# Patient Record
Sex: MALE | Race: WHITE | Employment: UNEMPLOYED | URBAN - METROPOLITAN AREA
[De-identification: names, ages, dates, MRNs, and addresses within clinical notes are randomized per-mention and may not be internally consistent; named-entity substitution may affect disease eponyms.]

---

## 2021-08-15 ENCOUNTER — APPOINTMENT (OUTPATIENT)
Dept: CT IMAGING | Age: 56
End: 2021-08-15

## 2021-08-15 ENCOUNTER — APPOINTMENT (OUTPATIENT)
Dept: GENERAL RADIOLOGY | Age: 56
End: 2021-08-15

## 2021-08-15 ENCOUNTER — HOSPITAL ENCOUNTER (EMERGENCY)
Age: 56
Discharge: LEFT BEFORE DISCHARGE | End: 2021-08-15
Attending: EMERGENCY MEDICINE

## 2021-08-15 VITALS
SYSTOLIC BLOOD PRESSURE: 114 MMHG | HEART RATE: 85 BPM | TEMPERATURE: 98.4 F | WEIGHT: 180 LBS | DIASTOLIC BLOOD PRESSURE: 82 MMHG | RESPIRATION RATE: 15 BRPM | BODY MASS INDEX: 25.2 KG/M2 | HEIGHT: 71 IN | OXYGEN SATURATION: 93 %

## 2021-08-15 DIAGNOSIS — R07.9 CHEST PAIN, UNSPECIFIED TYPE: Primary | ICD-10-CM

## 2021-08-15 LAB
A/G RATIO: 1.6 (ref 1.1–2.2)
ALBUMIN SERPL-MCNC: 4.3 G/DL (ref 3.4–5)
ALP BLD-CCNC: 87 U/L (ref 40–129)
ALT SERPL-CCNC: 37 U/L (ref 10–40)
ANION GAP SERPL CALCULATED.3IONS-SCNC: 18 MMOL/L (ref 3–16)
AST SERPL-CCNC: 27 U/L (ref 15–37)
BASOPHILS ABSOLUTE: 0.1 K/UL (ref 0–0.2)
BASOPHILS RELATIVE PERCENT: 1.1 %
BILIRUB SERPL-MCNC: <0.2 MG/DL (ref 0–1)
BUN BLDV-MCNC: 9 MG/DL (ref 7–20)
CALCIUM SERPL-MCNC: 9.4 MG/DL (ref 8.3–10.6)
CHLORIDE BLD-SCNC: 107 MMOL/L (ref 99–110)
CO2: 22 MMOL/L (ref 21–32)
CREAT SERPL-MCNC: <0.5 MG/DL (ref 0.9–1.3)
EOSINOPHILS ABSOLUTE: 0.3 K/UL (ref 0–0.6)
EOSINOPHILS RELATIVE PERCENT: 3.7 %
GFR AFRICAN AMERICAN: >60
GFR NON-AFRICAN AMERICAN: >60
GLOBULIN: 2.7 G/DL
GLUCOSE BLD-MCNC: 113 MG/DL (ref 70–99)
HCT VFR BLD CALC: 45.5 % (ref 40.5–52.5)
HEMOGLOBIN: 15.8 G/DL (ref 13.5–17.5)
LYMPHOCYTES ABSOLUTE: 4.5 K/UL (ref 1–5.1)
LYMPHOCYTES RELATIVE PERCENT: 51.9 %
MCH RBC QN AUTO: 35.8 PG (ref 26–34)
MCHC RBC AUTO-ENTMCNC: 34.7 G/DL (ref 31–36)
MCV RBC AUTO: 103.2 FL (ref 80–100)
MONOCYTES ABSOLUTE: 0.5 K/UL (ref 0–1.3)
MONOCYTES RELATIVE PERCENT: 6.3 %
NEUTROPHILS ABSOLUTE: 3.2 K/UL (ref 1.7–7.7)
NEUTROPHILS RELATIVE PERCENT: 37 %
PDW BLD-RTO: 15.1 % (ref 12.4–15.4)
PLATELET # BLD: 296 K/UL (ref 135–450)
PMV BLD AUTO: 7.4 FL (ref 5–10.5)
POTASSIUM REFLEX MAGNESIUM: 3.9 MMOL/L (ref 3.5–5.1)
RBC # BLD: 4.41 M/UL (ref 4.2–5.9)
SARS-COV-2, NAAT: NOT DETECTED
SODIUM BLD-SCNC: 147 MMOL/L (ref 136–145)
TOTAL PROTEIN: 7 G/DL (ref 6.4–8.2)
TROPONIN: <0.01 NG/ML
WBC # BLD: 8.6 K/UL (ref 4–11)

## 2021-08-15 PROCEDURE — 6370000000 HC RX 637 (ALT 250 FOR IP): Performed by: EMERGENCY MEDICINE

## 2021-08-15 PROCEDURE — 84484 ASSAY OF TROPONIN QUANT: CPT

## 2021-08-15 PROCEDURE — 93005 ELECTROCARDIOGRAM TRACING: CPT | Performed by: EMERGENCY MEDICINE

## 2021-08-15 PROCEDURE — 99284 EMERGENCY DEPT VISIT MOD MDM: CPT

## 2021-08-15 PROCEDURE — 80053 COMPREHEN METABOLIC PANEL: CPT

## 2021-08-15 PROCEDURE — 6360000004 HC RX CONTRAST MEDICATION: Performed by: EMERGENCY MEDICINE

## 2021-08-15 PROCEDURE — 71045 X-RAY EXAM CHEST 1 VIEW: CPT

## 2021-08-15 PROCEDURE — 87635 SARS-COV-2 COVID-19 AMP PRB: CPT

## 2021-08-15 PROCEDURE — 71260 CT THORAX DX C+: CPT

## 2021-08-15 PROCEDURE — 85025 COMPLETE CBC W/AUTO DIFF WBC: CPT

## 2021-08-15 RX ORDER — ASPIRIN 81 MG/1
324 TABLET, CHEWABLE ORAL ONCE
Status: COMPLETED | OUTPATIENT
Start: 2021-08-15 | End: 2021-08-15

## 2021-08-15 RX ADMIN — IOPAMIDOL 75 ML: 755 INJECTION, SOLUTION INTRAVENOUS at 02:54

## 2021-08-15 RX ADMIN — ASPIRIN 324 MG: 81 TABLET, CHEWABLE ORAL at 02:54

## 2021-08-15 ASSESSMENT — ENCOUNTER SYMPTOMS
BLOOD IN STOOL: 0
PHOTOPHOBIA: 0
NAUSEA: 0
SHORTNESS OF BREATH: 1
TROUBLE SWALLOWING: 0
VOMITING: 0
BACK PAIN: 0
VOICE CHANGE: 0
COLOR CHANGE: 0
ABDOMINAL PAIN: 0
STRIDOR: 0
WHEEZING: 0
FACIAL SWELLING: 0

## 2021-08-15 ASSESSMENT — PAIN DESCRIPTION - FREQUENCY: FREQUENCY: CONTINUOUS

## 2021-08-15 ASSESSMENT — PAIN DESCRIPTION - LOCATION: LOCATION: CHEST

## 2021-08-15 ASSESSMENT — PAIN DESCRIPTION - ONSET: ONSET: SUDDEN

## 2021-08-15 ASSESSMENT — PAIN DESCRIPTION - ORIENTATION: ORIENTATION: LEFT

## 2021-08-15 ASSESSMENT — PAIN SCALES - GENERAL: PAINLEVEL_OUTOF10: 8

## 2021-08-15 ASSESSMENT — PAIN DESCRIPTION - PROGRESSION: CLINICAL_PROGRESSION: GRADUALLY WORSENING

## 2021-08-15 ASSESSMENT — PAIN DESCRIPTION - PAIN TYPE: TYPE: ACUTE PAIN

## 2021-08-15 ASSESSMENT — PAIN DESCRIPTION - DESCRIPTORS: DESCRIPTORS: CONSTANT;SHARP

## 2021-08-15 NOTE — ED PROVIDER NOTES
[address] [facility]  ED  eMERGENCY dEPARTMENT eNCOUnter      Pt Name: Ervin Galvin  MRN: [id number]  Kehindegfraf 1965  Date of evaluation: 8/15/2021  Provider: Abdoul Araiza MD    [address] [place] [address]       Chief Complaint   Patient presents with    Chest Pain     Patient c/o of upper left chest pain for one hour PTA. HISTORY OF PRESENT ILLNESS   (Location/Symptom, Timing/Onset, Context/Setting, Quality, Duration, Modifying Factors, Severity)  Note limiting factors. The history is provided by the patient. Chest Pain  Pain location:  L chest  Pain quality: sharp    Pain radiates to:  L arm  Pain severity:  Moderate  Onset quality:  Gradual  Duration:  2 days  Timing:  Intermittent  Progression:  Waxing and waning  Chronicity:  New  Relieved by:  Nothing  Worsened by:  Nothing  Ineffective treatments:  None tried  Associated symptoms: shortness of breath    Associated symptoms: no abdominal pain, no back pain, no dysphagia, no fever, no nausea, no palpitations and no vomiting    Risk factors: hypertension and smoking    Risk factors: no coronary artery disease, no high cholesterol and not obese        Nursing Notes were reviewed. REVIEW OFSYSTEMS    (2-9 systems for level 4, 10 or more for level 5)     Review of Systems   Constitutional: Negative for appetite change, fever and unexpected weight change. HENT: Negative for facial swelling, trouble swallowing and voice change. Eyes: Negative for photophobia and visual disturbance. Respiratory: Positive for shortness of breath. Negative for wheezing and stridor. Cardiovascular: Positive for chest pain. Negative for palpitations. Gastrointestinal: Negative for abdominal pain, blood in stool, nausea and vomiting. Genitourinary: Negative for difficulty urinating and dysuria. Musculoskeletal: Negative for back pain, gait problem and neck pain. Skin: Negative for color change and wound.    Neurological: Negative for seizures, syncope and speech difficulty. Psychiatric/Behavioral: Negative for self-injury and suicidal ideas. Except as noted above the remainder of the review of systems was reviewed and negative. PAST MEDICAL HISTORY     Past Medical History:   Diagnosis Date    DDD (degenerative disc disease)          SURGICAL HISTORY       Past Surgical History:   Procedure Laterality Date    ABDOMEN SURGERY      FACIAL COSMETIC SURGERY      FOOT SURGERY           CURRENT MEDICATIONS       Discharge Medication List as of 8/15/2021  4:27 AM          ALLERGIES     Patient has no known allergies. FAMILY HISTORY     History reviewed. No pertinent family history. SOCIAL HISTORY       Social History     Socioeconomic History    Marital status:      Spouse name: None    Number of children: None    Years of education: None    Highest education level: None   Occupational History    None   Tobacco Use    Smoking status: Current Every Day Smoker     Packs/day: 1.00     Types: Cigarettes    Smokeless tobacco: Never Used   Vaping Use    Vaping Use: Never used   Substance and Sexual Activity    Alcohol use: Yes     Comment: 12 pk a week    Drug use: Yes     Types: Marijuana     Comment: rarely    Sexual activity: Yes     Partners: Female   Other Topics Concern    None   Social History Narrative    None     Social Determinants of Health     Financial Resource Strain:     Difficulty of Paying Living Expenses:    Food Insecurity:     Worried About Running Out of Food in the Last Year:     Ran Out of Food in the Last Year:    Transportation Needs:     Lack of Transportation (Medical):      Lack of Transportation (Non-Medical):    Physical Activity:     Days of Exercise per Week:     Minutes of Exercise per Session:    Stress:     Feeling of Stress :    Social Connections:     Frequency of Communication with Friends and Family:     Frequency of Social Gatherings with Friends and Family:     Attends Synagogue Services:     Active Member of Clubs or Organizations:     Attends Club or Organization Meetings:     Marital Status:    Intimate Partner Violence:     Fear of Current or Ex-Partner:     Emotionally Abused:     Physically Abused:     Sexually Abused:          PHYSICAL EXAM    (up to 7 for level 4, 8 or more for level 5)     ED Triage Vitals [08/15/21 0040]   BP Temp Temp Source Pulse Resp SpO2 Height Weight   (!) 141/96 98.7 °F (37.1 °C) Oral 108 18 95 % 5' 11\" (1.803 m) 180 lb (81.6 kg)       Physical Exam  Vitals and nursing note reviewed. Constitutional:       General: He is not in acute distress. Appearance: He is well-developed. HENT:      Head: Normocephalic and atraumatic. Right Ear: External ear normal.      Left Ear: External ear normal.   Eyes:      Conjunctiva/sclera: Conjunctivae normal.   Neck:      Vascular: No JVD. Trachea: No tracheal deviation. Cardiovascular:      Rate and Rhythm: Regular rhythm. Tachycardia present. Pulmonary:      Effort: Pulmonary effort is normal. No respiratory distress. Breath sounds: Normal breath sounds. No wheezing. Abdominal:      General: There is no distension. Palpations: Abdomen is soft. Tenderness: There is no abdominal tenderness. There is no guarding or rebound. Musculoskeletal:         General: No tenderness. Normal range of motion. Cervical back: Neck supple. Comments: No lower extremity swelling, tenderness, or palpable cord. Negative John test bilaterally. Skin:     General: Skin is warm and dry. Neurological:      General: No focal deficit present. Mental Status: He is alert and oriented to person, place, and time. Cranial Nerves: No cranial nerve deficit.          DIAGNOSTIC RESULTS     EKG:All EKG's are interpreted by the Emergency Department Physician who either signs or Co-signs this chart in the absence of a cardiologist.    The Ekg interpreted by me shows  sinus Oral   SpO2: 95% 96% 93%   Weight: 180 lb (81.6 kg)     Height: 5' 11\" (1.803 m)           MDM   HEART SCORE:    History: +1 for moderate suspicion  EKG: +1 for nonspecific changes   Age: +1 for age 44-72 years  Risk factors (includes HLD, HTN, DM, tobacco use, obesity, and +FHx): +1 for 1-2 risk factors  Initial troponin: +0 for negative troponin    Heart score: 4. This falls under the following category: Score of 4-6, which indicates low/moderate risk for major adverse cardiac event and supports observation with repeated troponins and/or non-invasive testing      Initial trend is unremarkable. CT chest pulmonary Mosman does not show any evidence of pneumothorax, pulmonary movement, pneumonia, or other emergent pathology. Rapid Covid test is negative. Based on patient's heart score of 4 I have recommended admission for ACS rule out and further medical evaluation. Patient politely refuses this. The patient as decided to leave against medical advice. The patient is awake and alert, non-intoxicated, non-suicidal, nonpsychotic. There is no medical condition that prevents or inhibits their understanding of the risks/benefits of their decision. The patient understands the risks and benefits of their decision and has been given the opportunity to ask questions about their medical condition. Procedures    FINAL IMPRESSION      1. Chest pain, unspecified type          DISPOSITION/PLAN   DISPOSITION [place] 08/15/2021 04:24:57 AM         (Please note that portions of this note were completed with a voice recognition program.  Efforts were made to edit the dictations but occasionally words aremis-transcribed. )    Jessica Desai MD (electronically signed)  Attending Emergency Physician           Jessica Desai MD  08/15/21 5665

## 2021-08-15 NOTE — ED NOTES
I had a long discussion with .his at the bedside regarding the risks and  benefits of further treatment and he wishes to go home. Patient is made aware  of risks including the progression to severe disease or the possibility of death. his  decisional capacity is intact and clearly understands what I am recommending and why. The patient declines the further work up at this time. I have made it clear that he  may return to the ED at any time for further evaluation and treatment. he reports  that he understands that he may return at any time. Patient has chosen to sign out AMA. I have had a long discussion with the patient  regarding the risks associated with leaving prior to completion of their evaluation. These risks include but are not limited to death and severe disability. They understand  the risks associated with leaving and are competent to make this decision. They have been encouraged to return at any time for further evaluation.      Davina Gomez RN  08/15/21 1959

## 2021-08-16 LAB
EKG ATRIAL RATE: 102 BPM
EKG DIAGNOSIS: NORMAL
EKG P AXIS: 62 DEGREES
EKG P-R INTERVAL: 144 MS
EKG Q-T INTERVAL: 336 MS
EKG QRS DURATION: 80 MS
EKG QTC CALCULATION (BAZETT): 437 MS
EKG R AXIS: 43 DEGREES
EKG T AXIS: -11 DEGREES
EKG VENTRICULAR RATE: 102 BPM

## 2021-08-16 PROCEDURE — 93010 ELECTROCARDIOGRAM REPORT: CPT | Performed by: INTERNAL MEDICINE

## 2021-08-18 ENCOUNTER — HOSPITAL ENCOUNTER (OUTPATIENT)
Age: 56
Setting detail: OBSERVATION
Discharge: HOME | End: 2021-08-19
Attending: EMERGENCY MEDICINE | Admitting: INTERNAL MEDICINE

## 2021-08-18 DIAGNOSIS — R07.9 CHEST PAIN, UNSPECIFIED TYPE: Primary | ICD-10-CM

## 2021-08-18 LAB
BASOPHILS ABSOLUTE: 0.1 K/UL (ref 0–0.2)
BASOPHILS RELATIVE PERCENT: 1 %
EOSINOPHILS ABSOLUTE: 0.3 K/UL (ref 0–0.6)
EOSINOPHILS RELATIVE PERCENT: 3.9 %
HCT VFR BLD CALC: 45.2 % (ref 40.5–52.5)
HEMOGLOBIN: 15.6 G/DL (ref 13.5–17.5)
LYMPHOCYTES ABSOLUTE: 2.7 K/UL (ref 1–5.1)
LYMPHOCYTES RELATIVE PERCENT: 41.5 %
MCH RBC QN AUTO: 35.4 PG (ref 26–34)
MCHC RBC AUTO-ENTMCNC: 34.6 G/DL (ref 31–36)
MCV RBC AUTO: 102.5 FL (ref 80–100)
MONOCYTES ABSOLUTE: 0.4 K/UL (ref 0–1.3)
MONOCYTES RELATIVE PERCENT: 6.3 %
NEUTROPHILS ABSOLUTE: 3.1 K/UL (ref 1.7–7.7)
NEUTROPHILS RELATIVE PERCENT: 47.3 %
PDW BLD-RTO: 14.9 % (ref 12.4–15.4)
PLATELET # BLD: 278 K/UL (ref 135–450)
PMV BLD AUTO: 7.2 FL (ref 5–10.5)
RBC # BLD: 4.41 M/UL (ref 4.2–5.9)
WBC # BLD: 6.6 K/UL (ref 4–11)

## 2021-08-18 PROCEDURE — 85025 COMPLETE CBC W/AUTO DIFF WBC: CPT

## 2021-08-18 PROCEDURE — 80053 COMPREHEN METABOLIC PANEL: CPT

## 2021-08-18 PROCEDURE — 83880 ASSAY OF NATRIURETIC PEPTIDE: CPT

## 2021-08-18 PROCEDURE — 93005 ELECTROCARDIOGRAM TRACING: CPT | Performed by: PHYSICIAN ASSISTANT

## 2021-08-18 PROCEDURE — 99285 EMERGENCY DEPT VISIT HI MDM: CPT

## 2021-08-18 PROCEDURE — 84484 ASSAY OF TROPONIN QUANT: CPT

## 2021-08-18 ASSESSMENT — PAIN DESCRIPTION - DESCRIPTORS: DESCRIPTORS: SHARP

## 2021-08-18 ASSESSMENT — PAIN DESCRIPTION - FREQUENCY: FREQUENCY: CONTINUOUS

## 2021-08-18 ASSESSMENT — PAIN DESCRIPTION - PAIN TYPE: TYPE: ACUTE PAIN

## 2021-08-18 ASSESSMENT — PAIN SCALES - GENERAL: PAINLEVEL_OUTOF10: 8

## 2021-08-18 ASSESSMENT — PAIN DESCRIPTION - ORIENTATION: ORIENTATION: LEFT

## 2021-08-18 ASSESSMENT — PAIN DESCRIPTION - LOCATION: LOCATION: CHEST;SHOULDER;ARM

## 2021-08-18 ASSESSMENT — PAIN DESCRIPTION - ONSET: ONSET: ON-GOING

## 2021-08-19 ENCOUNTER — APPOINTMENT (OUTPATIENT)
Dept: GENERAL RADIOLOGY | Age: 56
End: 2021-08-19

## 2021-08-19 VITALS
TEMPERATURE: 98.7 F | WEIGHT: 180 LBS | HEART RATE: 92 BPM | BODY MASS INDEX: 25.2 KG/M2 | DIASTOLIC BLOOD PRESSURE: 83 MMHG | OXYGEN SATURATION: 94 % | HEIGHT: 71 IN | RESPIRATION RATE: 16 BRPM | SYSTOLIC BLOOD PRESSURE: 137 MMHG

## 2021-08-19 PROBLEM — R07.9 CHEST PAIN: Status: ACTIVE | Noted: 2021-08-19

## 2021-08-19 PROBLEM — R07.89 NON-CARDIAC CHEST PAIN: Status: ACTIVE | Noted: 2021-08-19

## 2021-08-19 LAB
A/G RATIO: 1.6 (ref 1.1–2.2)
ALBUMIN SERPL-MCNC: 4.4 G/DL (ref 3.4–5)
ALP BLD-CCNC: 79 U/L (ref 40–129)
ALT SERPL-CCNC: 33 U/L (ref 10–40)
ANION GAP SERPL CALCULATED.3IONS-SCNC: 15 MMOL/L (ref 3–16)
ANION GAP SERPL CALCULATED.3IONS-SCNC: 15 MMOL/L (ref 3–16)
AST SERPL-CCNC: 32 U/L (ref 15–37)
BILIRUB SERPL-MCNC: <0.2 MG/DL (ref 0–1)
BILIRUBIN URINE: NEGATIVE
BLOOD, URINE: NEGATIVE
BUN BLDV-MCNC: 7 MG/DL (ref 7–20)
BUN BLDV-MCNC: 9 MG/DL (ref 7–20)
CALCIUM SERPL-MCNC: 8.7 MG/DL (ref 8.3–10.6)
CALCIUM SERPL-MCNC: 9.4 MG/DL (ref 8.3–10.6)
CHLORIDE BLD-SCNC: 100 MMOL/L (ref 99–110)
CHLORIDE BLD-SCNC: 102 MMOL/L (ref 99–110)
CHOLESTEROL, TOTAL: 198 MG/DL (ref 0–199)
CLARITY: CLEAR
CO2: 21 MMOL/L (ref 21–32)
CO2: 23 MMOL/L (ref 21–32)
COLOR: YELLOW
CREAT SERPL-MCNC: <0.5 MG/DL (ref 0.9–1.3)
CREAT SERPL-MCNC: <0.5 MG/DL (ref 0.9–1.3)
EKG ATRIAL RATE: 86 BPM
EKG DIAGNOSIS: NORMAL
EKG P AXIS: 23 DEGREES
EKG P-R INTERVAL: 134 MS
EKG Q-T INTERVAL: 356 MS
EKG QRS DURATION: 86 MS
EKG QTC CALCULATION (BAZETT): 426 MS
EKG R AXIS: 41 DEGREES
EKG T AXIS: -9 DEGREES
EKG VENTRICULAR RATE: 86 BPM
GFR AFRICAN AMERICAN: >60
GFR AFRICAN AMERICAN: >60
GFR NON-AFRICAN AMERICAN: >60
GFR NON-AFRICAN AMERICAN: >60
GLOBULIN: 2.7 G/DL
GLUCOSE BLD-MCNC: 106 MG/DL (ref 70–99)
GLUCOSE BLD-MCNC: 85 MG/DL (ref 70–99)
GLUCOSE URINE: NEGATIVE MG/DL
HCT VFR BLD CALC: 43.5 % (ref 40.5–52.5)
HDLC SERPL-MCNC: 102 MG/DL (ref 40–60)
HEMOGLOBIN: 14.9 G/DL (ref 13.5–17.5)
KETONES, URINE: ABNORMAL MG/DL
LDL CHOLESTEROL CALCULATED: 84 MG/DL
LEUKOCYTE ESTERASE, URINE: NEGATIVE
LV EF: 50 %
LVEF MODALITY: NORMAL
MAGNESIUM: 2.1 MG/DL (ref 1.8–2.4)
MCH RBC QN AUTO: 35.4 PG (ref 26–34)
MCHC RBC AUTO-ENTMCNC: 34.3 G/DL (ref 31–36)
MCV RBC AUTO: 103.2 FL (ref 80–100)
MICROSCOPIC EXAMINATION: ABNORMAL
NITRITE, URINE: NEGATIVE
PDW BLD-RTO: 15.5 % (ref 12.4–15.4)
PH UA: 6 (ref 5–8)
PLATELET # BLD: 249 K/UL (ref 135–450)
PMV BLD AUTO: 7.5 FL (ref 5–10.5)
POTASSIUM REFLEX MAGNESIUM: 3.5 MMOL/L (ref 3.5–5.1)
POTASSIUM REFLEX MAGNESIUM: 3.7 MMOL/L (ref 3.5–5.1)
PRO-BNP: 71 PG/ML (ref 0–124)
PROTEIN UA: NEGATIVE MG/DL
RBC # BLD: 4.22 M/UL (ref 4.2–5.9)
SODIUM BLD-SCNC: 138 MMOL/L (ref 136–145)
SODIUM BLD-SCNC: 138 MMOL/L (ref 136–145)
SPECIFIC GRAVITY UA: 1.01 (ref 1–1.03)
TOTAL PROTEIN: 7.1 G/DL (ref 6.4–8.2)
TRIGL SERPL-MCNC: 61 MG/DL (ref 0–150)
TROPONIN: <0.01 NG/ML
URINE TYPE: ABNORMAL
UROBILINOGEN, URINE: 0.2 E.U./DL
VLDLC SERPL CALC-MCNC: 12 MG/DL
WBC # BLD: 5.1 K/UL (ref 4–11)

## 2021-08-19 PROCEDURE — 6370000000 HC RX 637 (ALT 250 FOR IP): Performed by: NURSE PRACTITIONER

## 2021-08-19 PROCEDURE — G0378 HOSPITAL OBSERVATION PER HR: HCPCS

## 2021-08-19 PROCEDURE — 71045 X-RAY EXAM CHEST 1 VIEW: CPT

## 2021-08-19 PROCEDURE — 85027 COMPLETE CBC AUTOMATED: CPT

## 2021-08-19 PROCEDURE — 84484 ASSAY OF TROPONIN QUANT: CPT

## 2021-08-19 PROCEDURE — 6370000000 HC RX 637 (ALT 250 FOR IP): Performed by: PHYSICIAN ASSISTANT

## 2021-08-19 PROCEDURE — 83735 ASSAY OF MAGNESIUM: CPT

## 2021-08-19 PROCEDURE — 93010 ELECTROCARDIOGRAM REPORT: CPT | Performed by: INTERNAL MEDICINE

## 2021-08-19 PROCEDURE — 93351 STRESS TTE COMPLETE: CPT

## 2021-08-19 PROCEDURE — 80061 LIPID PANEL: CPT

## 2021-08-19 PROCEDURE — 36415 COLL VENOUS BLD VENIPUNCTURE: CPT

## 2021-08-19 PROCEDURE — 80048 BASIC METABOLIC PNL TOTAL CA: CPT

## 2021-08-19 PROCEDURE — 81003 URINALYSIS AUTO W/O SCOPE: CPT

## 2021-08-19 RX ORDER — ATORVASTATIN CALCIUM 40 MG/1
40 TABLET, FILM COATED ORAL NIGHTLY
Status: DISCONTINUED | OUTPATIENT
Start: 2021-08-19 | End: 2021-08-19 | Stop reason: HOSPADM

## 2021-08-19 RX ORDER — ACETAMINOPHEN 325 MG/1
650 TABLET ORAL ONCE
Status: COMPLETED | OUTPATIENT
Start: 2021-08-19 | End: 2021-08-19

## 2021-08-19 RX ORDER — ONDANSETRON 2 MG/ML
4 INJECTION INTRAMUSCULAR; INTRAVENOUS EVERY 6 HOURS PRN
Status: DISCONTINUED | OUTPATIENT
Start: 2021-08-19 | End: 2021-08-19 | Stop reason: HOSPADM

## 2021-08-19 RX ORDER — SODIUM CHLORIDE 0.9 % (FLUSH) 0.9 %
10 SYRINGE (ML) INJECTION EVERY 12 HOURS SCHEDULED
Status: DISCONTINUED | OUTPATIENT
Start: 2021-08-19 | End: 2021-08-19 | Stop reason: HOSPADM

## 2021-08-19 RX ORDER — ASPIRIN 81 MG/1
81 TABLET, CHEWABLE ORAL DAILY
Status: DISCONTINUED | OUTPATIENT
Start: 2021-08-20 | End: 2021-08-19 | Stop reason: HOSPADM

## 2021-08-19 RX ORDER — ACETAMINOPHEN 650 MG/1
650 SUPPOSITORY RECTAL EVERY 6 HOURS PRN
Status: DISCONTINUED | OUTPATIENT
Start: 2021-08-19 | End: 2021-08-19 | Stop reason: HOSPADM

## 2021-08-19 RX ORDER — SODIUM CHLORIDE 0.9 % (FLUSH) 0.9 %
10 SYRINGE (ML) INJECTION PRN
Status: DISCONTINUED | OUTPATIENT
Start: 2021-08-19 | End: 2021-08-19 | Stop reason: HOSPADM

## 2021-08-19 RX ORDER — SODIUM CHLORIDE 9 MG/ML
25 INJECTION, SOLUTION INTRAVENOUS PRN
Status: DISCONTINUED | OUTPATIENT
Start: 2021-08-19 | End: 2021-08-19 | Stop reason: HOSPADM

## 2021-08-19 RX ORDER — NICOTINE 21 MG/24HR
1 PATCH, TRANSDERMAL 24 HOURS TRANSDERMAL DAILY
Status: DISCONTINUED | OUTPATIENT
Start: 2021-08-19 | End: 2021-08-19 | Stop reason: HOSPADM

## 2021-08-19 RX ORDER — ONDANSETRON 4 MG/1
4 TABLET, ORALLY DISINTEGRATING ORAL EVERY 8 HOURS PRN
Status: DISCONTINUED | OUTPATIENT
Start: 2021-08-19 | End: 2021-08-19 | Stop reason: HOSPADM

## 2021-08-19 RX ORDER — ACETAMINOPHEN 325 MG/1
650 TABLET ORAL EVERY 6 HOURS PRN
Status: DISCONTINUED | OUTPATIENT
Start: 2021-08-19 | End: 2021-08-19 | Stop reason: HOSPADM

## 2021-08-19 RX ADMIN — ACETAMINOPHEN 650 MG: 325 TABLET ORAL at 01:19

## 2021-08-19 RX ADMIN — NITROGLYCERIN 1 INCH: 20 OINTMENT TOPICAL at 05:17

## 2021-08-19 ASSESSMENT — PAIN SCALES - GENERAL
PAINLEVEL_OUTOF10: 0
PAINLEVEL_OUTOF10: 8
PAINLEVEL_OUTOF10: 0
PAINLEVEL_OUTOF10: 7

## 2021-08-19 ASSESSMENT — PAIN DESCRIPTION - LOCATION: LOCATION: CHEST

## 2021-08-19 ASSESSMENT — PAIN DESCRIPTION - PAIN TYPE: TYPE: ACUTE PAIN

## 2021-08-19 NOTE — DISCHARGE INSTR - COC
Continuity of Care Form    Patient Name: Rodney Pedroza   :  1965  MRN:  [id number]    Admit date:  2021  Discharge date:  ***    Code Status Order: Full Code   Advance Directives:     Admitting Physician:  Viola Kraft MD  PCP: No primary care provider on file. Discharging Nurse: [facility] Unit/Room#: 1690/7139-41  Discharging Unit Phone Number: ***    Emergency Contact:   Extended Emergency Contact Information  Primary Emergency Contact: Mike Arvizu  Address: [address] [place] [address], [address] [facility] Phone: [phone]  Work Phone: [phone]  Mobile Phone: [phone]  Relation: Spouse  Secondary Emergency Contact: None,Per Pt  Relation: Other    Past Surgical History:  Past Surgical History:   Procedure Laterality Date    ABDOMEN SURGERY      FACIAL COSMETIC SURGERY      FOOT SURGERY         Immunization History: There is no immunization history on file for this patient.     Active Problems:  Patient Active Problem List   Diagnosis Code    Chest pain R07.9       Isolation/Infection:   Isolation          No Isolation        Patient Infection Status     Infection Onset Added Last Indicated Last Indicated By Review Planned Expiration Resolved Resolved By    None active    Resolved    COVID-19 Rule Out 08/15/21 08/15/21 08/15/21 COVID-19, Rapid (Ordered)   08/15/21 Rule-Out Test Resulted          Nurse Assessment:  Last Vital Signs: /83   Pulse 92   Temp 98.7 °F (37.1 °C) (Oral)   Resp 16   Ht 5' 11\" (1.803 m)   Wt 180 lb (81.6 kg)   SpO2 94%   BMI 25.10 kg/m²     Last documented pain score (0-10 scale): Pain Level: 0  Last Weight:   Wt Readings from Last 1 Encounters:   21 180 lb (81.6 kg)     Mental Status:  {IP PT MENTAL STATUS:46583}    IV Access:  { JEREMIAH IV ACCESS:242649794}    Nursing Mobility/ADLs:  Walking   {CHP DME AQUC:370139326}  Transfer  {CHP DME MEPX:779925992}  Bathing  {CHP DME HYXU:917256166}  Dressing  {CHP DME DRVZ:320442073}  Toileting  {CHP DME KYND:887841544}  Feeding  {CHP DME BFZB:801269253}  Med Admin  {CHP DME KAHK:536276272}  Med Delivery   { JEREMIAH MED Delivery:351245165}    Wound Care Documentation and Therapy:        Elimination:  Continence:   · Bowel: {YES / NS:49840}  · Bladder: {YES / DR:27652}  Urinary Catheter: {Urinary Catheter:982270427}   Colostomy/Ileostomy/Ileal Conduit: {YES / ZV:20360}       Date of Last BM: ***    Intake/Output Summary (Last 24 hours) at 2021 1657  Last data filed at 2021 1050  Gross per 24 hour   Intake 0 ml   Output --   Net 0 ml     No intake/output data recorded.     Safety Concerns:     [address] Safety Concerns:848430598}    Impairments/Disabilities:      [address] Impairments/Disabilities:089311934}    Nutrition Therapy:  Current Nutrition Therapy:   [address] Diet List:008024863}    Routes of Feeding: {[facility] DME Other Feedings:065092050}  Liquids: {Slp liquid thickness:43960}  Daily Fluid Restriction: {CHP DME Yes amt example:918204953}  Last Modified Barium Swallow with Video (Video Swallowing Test): {Done Not Done FZGO:088859891}    Treatments at the Time of Hospital Discharge:   Respiratory Treatments: ***  Oxygen Therapy:  {Therapy; copd oxygen:17653}  Ventilator:    { CC Vent XPCK:344285737}    Rehab Therapies: {THERAPEUTIC INTERVENTION:4981730769}  Weight Bearing Status/Restrictions: [employer] Weight Bearin}  Other Medical Equipment (for information only, NOT a DME order):  {EQUIPMENT:172014235}  Other Treatments: ***    Patient's personal belongings (please select all that are sent with patient):  {P DME Belongings:825225156}    RN SIGNATURE:  {Esignature:847299238}    CASE MANAGEMENT/SOCIAL WORK SECTION    Inpatient Status Date: ***    Readmission Risk Assessment Score:  Readmission Risk              Risk of Unplanned Readmission:  0           Discharging to Facility/ Agency   · Name: · Address:  · Phone:  · Fax:    Dialysis Facility (if applicable)   · Name:  · Address:  · Dialysis Schedule:  · Phone:  · Fax:    / signature: {Esignature:518576261}    PHYSICIAN SECTION    Prognosis: {Prognosis:1962643385}    Condition at Discharge: Espinoza Mcguire Patient Condition:636411826}    Rehab Potential (if transferring to Rehab): {Prognosis:4467499309}    Recommended Labs or Other Treatments After Discharge: ***    Physician Certification: I certify the above information and transfer of Adolfo Latin  is necessary for the continuing treatment of the diagnosis listed and that he requires {Admit to Appropriate Level of Care:59415} for {GREATER/LESS:699425123} 30 days.      Update Admission H&P: {CHP DME Changes in NRGQB:710102369}    PHYSICIAN SIGNATURE:  {Esignature:527956597}

## 2021-08-19 NOTE — ED NOTES
Bed: 10  Expected date: 8/18/21  Expected time: 10:38 PM  Means of arrival: Ambulance  Comments:  [facility] [address] [facility], RN  08/18/21 8309

## 2021-08-19 NOTE — PROGRESS NOTES
Patient is back from stress test and has had echo. His blood pressure is still high at 173/113, no PRN medication order except for Nitro paste for chest pain. Paged Dr. Annamarie Escobar to inquire about PRN medication.

## 2021-08-19 NOTE — DISCHARGE SUMMARY
Hospital Medicine Discharge Summary    Patient ID: [id number]      Patient's PCP: No primary care provider on file. Admit Date: 8/18/2021     Discharge Date: 8/19/2021 08/19/2021 at 1800    Admitting Physician: Jayant Mitchell MD     Discharge Physician: Maia Nieves MD     Discharge Diagnoses: Active Hospital Problems    Diagnosis     Non-cardiac chest pain [R07.89]        The patient was seen and examined on day of discharge and this discharge summary is in conjunction with any daily progress note from day of discharge. Hospital Course:     49 yo male tobacco user with no known past medical history who presents with atypical chest pain (left shoulder, non radiating and not associated with exertion) and shortness of breath admitted to the hospital. No history of htn or CAD. Unknown family history. Heart score 2. EKG without REYNA elevations. Troponin x 3 normal. Exercise EKG/Echo stress test deemed low risk. Discharged home with strict return precautions. Provided PCP information to establish care. Physical Exam Performed:     /83   Pulse 92   Temp 98.7 °F (37.1 °C) (Oral)   Resp 16   Ht 5' 11\" (1.803 m)   Wt 180 lb (81.6 kg)   SpO2 94%   BMI 25.10 kg/m²       General appearance:  No apparent distress, appears stated age and cooperative. HEENT:  Normal cephalic, atraumatic without obvious deformity. Pupils equal, round, and reactive to light. Extra ocular muscles intact. Conjunctivae/corneas clear. Neck: Supple, with full range of motion. No jugular venous distention. Trachea midline. Respiratory:  Normal respiratory effort. Clear to auscultation, bilaterally without Rales/Wheezes/Rhonchi. Cardiovascular:  Regular rate and rhythm with normal S1/S2 without murmurs, rubs or gallops. Abdomen: Soft, non-tender, non-distended with normal bowel sounds. Musculoskeletal:  No clubbing, cyanosis or edema bilaterally. Full range of motion without deformity.   Skin: Skin

## 2021-08-19 NOTE — CARE COORDINATION
CM met at bedside with patient. Triggered by no PCP or insurance listed. With patient permission, consult to financial counselor and information for [facility] added to AVS. Pt denies further needs. States spouse can transport him home when ready.

## 2021-08-19 NOTE — H&P
Hospital Medicine History & Physical      PCP: No primary care provider on file. Date of Admission: 8/18/2021    Date of Service: Pt seen/examined on 8/19/2021 and Admitted to observation with expected LOS less than two midnights due to medical therapy. Chief Complaint:    Chief Complaint   Patient presents with    Chest Pain     Pt was in the ED on Saturday and encouraged to be admitted for chest pain r/o. Pt did not want to stay. Pt states he began having symptoms today about 0800. Pt took 273 of aspirin and nitro X1 prior to arrival.     Arm Pain     History Of Present Illness:      48 y.o. male, with PMH of tobacco abuse, who presented to [facility] with chest pain. History was obtained from the patient and review of the EMR. The patient states that he presented to the ED on 8/15/2021 due to having significant chest pain and shortness of breath. He states that the pain is in his upper left chest and radiated down his left arm. He does not have any information regarding his family history as he is adopted. He does admit to smoking about a pack of cigarettes every day. On that day, the patient also had CT chest which was unremarkable. He ultimately refused admission for ACS rule out. Yesterday evening, the patient presented back into the ED due to having recurring chest pain in the same area and radiating down his left arm. He became very concerned and decided to come back in for ACS rule out with stress testing. He will be admitted for further evaluation. The patient states that he took aspirin at home and also was given nitro per EMS. He states that the nitro did seem to relieve some of his pain.     Past Medical History:          Diagnosis Date    DDD (degenerative disc disease)        Past Surgical History:          Procedure Laterality Date    ABDOMEN SURGERY      FACIAL COSMETIC SURGERY      FOOT SURGERY         Medications Prior to Admission:      Prior to Admission 23   BUN 7   CREATININE <0.5*   CALCIUM 9.4     Recent Labs     08/18/21  2330   AST 32   ALT 33   BILITOT <0.2   ALKPHOS 79     No results for input(s): INR in the last 72 hours. Recent Labs     08/18/21  2330   TROPONINI <0.01       Urinalysis:      Lab Results   Component Value Date    NITRU Negative 06/01/2018    BLOODU Negative 06/01/2018    SPECGRAV 1.025 06/01/2018    GLUCOSEU Negative 06/01/2018       Radiology:     CXR: I have reviewed the CXR with the following interpretation: No acute cardiopulmonary findings  EKG:  I have reviewed the EKG with the following interpretation: NSR, nonspecific ST abnormality    XR CHEST PORTABLE   Final Result   No acute disease. ASSESSMENT:    Active Hospital Problems    Diagnosis Date Noted    Chest pain [R07.9] 08/19/2021         PLAN:    Chest pain in setting of no known HX of CAD. - Serial troponin - initial negative  - EKG w/o ischemic changes  - FLP in am  - NPO after MN  - Stress echo in am  - PRN nitro  - Tele monitoring  - Heart score 4  - Ordered asa, statin    Tobacco abuse disorder. Current pack a day smoker.  - Tobacco cessation education  - Nicotine patch      DVT Prophylaxis: Lovenox  Diet: No diet orders on file  Code Status: No Order    PT/OT Eval Status: Not indicated    Dispo -pending cardiac work-up       Beatriz Doherty - NP    Thank you No primary care provider on file. for the opportunity to be involved in this patient's care.  If you have any questions or concerns please feel free to contact me at [phone].  -------------------------Dr. Erika Cunningham-------------------------

## 2021-08-19 NOTE — ED PROVIDER NOTES
I independently examined and evaluated [address] [place] [address]. All diagnostic, treatment, and disposition decisions were made by myself in conjunction with the BEN, Fransisca Gonzalez. For all further details of the patient's emergency department visit, please see their documentation. 60-year-old male presents with chest pain. He was seen here last weekend for chest pain and had a cardiac evaluation. Admission was recommended but the patient did not want to stay. He states since being home he is continued to have chest pain so he was concerned and felt like he needed to return. He states he does not see doctors and does not take very good care of himself. He is having left-sided chest pain that radiates down his left arm. Vitals:    08/19/21 0315   BP: (!) 141/84   Pulse: 80   Resp: 16   Temp: 98.3 °F (36.8 °C)   SpO2: 94%       On exam the patient is in no acute distress. Heart is regular rate and rhythm, no respiratory distress.       Results for orders placed or performed during the hospital encounter of 08/18/21   CBC Auto Differential   Result Value Ref Range    WBC 6.6 4.0 - 11.0 K/uL    RBC 4.41 4.20 - 5.90 M/uL    Hemoglobin 15.6 13.5 - 17.5 g/dL    Hematocrit 45.2 40.5 - 52.5 %    .5 (H) 80.0 - 100.0 fL    MCH 35.4 (H) 26.0 - 34.0 pg    MCHC 34.6 31.0 - 36.0 g/dL    RDW 14.9 12.4 - 15.4 %    Platelets 749 946 - 039 K/uL    MPV 7.2 5.0 - 10.5 fL    Neutrophils % 47.3 %    Lymphocytes % 41.5 %    Monocytes % 6.3 %    Eosinophils % 3.9 %    Basophils % 1.0 %    Neutrophils Absolute 3.1 1.7 - 7.7 K/uL    Lymphocytes Absolute 2.7 1.0 - 5.1 K/uL    Monocytes Absolute 0.4 0.0 - 1.3 K/uL    Eosinophils Absolute 0.3 0.0 - 0.6 K/uL    Basophils Absolute 0.1 0.0 - 0.2 K/uL   Comprehensive Metabolic Panel w/ Reflex to MG   Result Value Ref Range    Sodium 138 136 - 145 mmol/L    Potassium reflex Magnesium 3.7 3.5 - 5.1 mmol/L    Chloride 100 99 - 110 mmol/L    CO2 23 21 - 32 mmol/L    Anion Gap 15 3 - 16 Glucose 106 (H) 70 - 99 mg/dL    BUN 7 7 - 20 mg/dL    CREATININE <0.5 (L) 0.9 - 1.3 mg/dL    GFR Non-African American >60 >60    GFR African American >60 >60    Calcium 9.4 8.3 - 10.6 mg/dL    Total Protein 7.1 6.4 - 8.2 g/dL    Albumin 4.4 3.4 - 5.0 g/dL    Albumin/Globulin Ratio 1.6 1.1 - 2.2    Total Bilirubin <0.2 0.0 - 1.0 mg/dL    Alkaline Phosphatase 79 40 - 129 U/L    ALT 33 10 - 40 U/L    AST 32 15 - 37 U/L    Globulin 2.7 g/dL   Troponin   Result Value Ref Range    Troponin <0.01 <0.01 ng/mL   Brain Natriuretic Peptide   Result Value Ref Range    Pro-BNP 71 0 - 124 pg/mL   Urinalysis, reflex to microscopic   Result Value Ref Range    Color, UA Yellow Straw/Yellow    Clarity, UA Clear Clear    Glucose, Ur Negative Negative mg/dL    Bilirubin Urine Negative Negative    Ketones, Urine TRACE (A) Negative mg/dL    Specific Gravity, UA 1.010 1.005 - 1.030    Blood, Urine Negative Negative    pH, UA 6.0 5.0 - 8.0    Protein, UA Negative Negative mg/dL    Urobilinogen, Urine 0.2 <2.0 E.U./dL    Nitrite, Urine Negative Negative    Leukocyte Esterase, Urine Negative Negative    Microscopic Examination Not Indicated     Urine Type NotGiven    Troponin   Result Value Ref Range    Troponin <0.01 <0.01 ng/mL   EKG 12 Lead   Result Value Ref Range    Ventricular Rate 86 BPM    Atrial Rate 86 BPM    P-R Interval 134 ms    QRS Duration 86 ms    Q-T Interval 356 ms    QTc Calculation (Bazett) 426 ms    P Axis 23 degrees    R Axis 41 degrees    T Axis -9 degrees    Diagnosis       Normal sinus rhythmNonspecific ST abnormalityAbnormal ECGWhen compared with ECG of 15-AUG-2021 00:23,No significant change was found         XR CHEST PORTABLE   Final Result   No acute disease.              EKG Interpretation    Interpreted by emergency department physician    Rhythm: normal sinus   Rate: normal  Axis: normal  Ectopy: none  Conduction: normal  ST Segments: Nonspecific changes  T Waves: normal  Q Waves: none    Clinical Impression: non-specific EKG    Lyudmila Alcantar MD    Patient's work-up here is unremarkable with EKG with no ischemic changes, troponin negative, lab work and chest x-ray unremarkable.   We will admit the patient for further cardiac evaluation and likely stress test.     Lyudmila Alcantar MD  08/19/21 0930

## 2021-08-19 NOTE — PROGRESS NOTES
Discharge instructions reviewed with patient. Pt has no home medications to review. All questions answered and patient verbalized understanding. Pt encouraged to schedule f/u. Discharge instructions signed and copies given. Patient discharged 8/19/2021 at approx. 1800 with belongings.

## 2021-08-19 NOTE — PROGRESS NOTES
Dr. Clair Nazario came up to assess patient from perfecting severing. No new orders or PRN blood pressure medication at this time as patient is asymptomatic.   Will wait to see what testing result say per Dr. Mayo Letters

## 2021-08-19 NOTE — PROGRESS NOTES
Orders to discharge pt. IV removed per protocol without issue. No complications and pt tolerated well. Pt tele box removed. Lissett notified. Telebox cleansed and sanitized per protocol. Turned in telebox number [phone].

## 2021-08-19 NOTE — ED NOTES
Report given to Jaclyn longoria RN. Pt transported via wheelchair.       Tariq Bee RN  08/19/21 2693

## 2021-08-19 NOTE — ED PROVIDER NOTES
[facility] Emergency Department    CHIEF COMPLAINT  Chest Pain (Pt was in the ED on Saturday and encouraged to be admitted for chest pain r/o. Pt did not want to stay. Pt states he began having symptoms today about 0800. Pt took 273 of aspirin and nitro X1 prior to arrival. ) and Arm Pain      SHARED SERVICE VISIT  Patient was evaluated by myself as well as the attending physician Dr. John Davidson. HISTORY OF PRESENT ILLNESS  Susan Dawkins is a 48 y.o. male history of current tobacco use presents emergency department for evaluation of chest pain. Patient was seen in emergency department on Saturday when she was recommended that he stay for cardiac evaluation however the patient decided to leave. Since then patient states the pain has continued therefore he decided to return for evaluation. Patient states the pain is sharp, intermittent and on the left side of his chest that radiates down his left arm. Patient denies any alleviating or aggravating factors. Patient states that he has not seen a doctor since he was roughly 14years old and has never had a cardiac evaluation. Denies any associated shortness of breath, nausea, vomiting, weakness. No other complaints, modifying factors or associated symptoms. Nursing notes reviewed. Past Medical History:   Diagnosis Date    DDD (degenerative disc disease)      Past Surgical History:   Procedure Laterality Date    ABDOMEN SURGERY      FACIAL COSMETIC SURGERY      FOOT SURGERY       History reviewed. No pertinent family history.   Social History     Socioeconomic History    Marital status:      Spouse name: Not on file    Number of children: Not on file    Years of education: Not on file    Highest education level: Not on file   Occupational History    Not on file   Tobacco Use    Smoking status: Current Every Day Smoker     Packs/day: 1.00     Types: Cigarettes    Smokeless tobacco: Never Used   Vaping Use    exchange. Speaking comfortably in full sentences. ABDOMEN: Nonreproducible chest pain on palpation. Soft. Non-distended. Non-tender. No guarding or rebound. No masses. No organomegaly. EXTREMITIES: No peripheral edema. Moves all extremities equally. All extremities neurovascularly intact. SKIN: Warm and dry. No acute rashes. NEUROLOGICAL: Alert and oriented. CN's 2-12 intact. No gross facial drooping. Strength 5/5, sensation intact. PSYCHIATRIC: Normal mood and affect. RADIOLOGY  XR CHEST PORTABLE    (Results Pending)     EXAMINATION:   CTA OF THE CHEST 8/15/2021 2:36 am       TECHNIQUE:   CTA of the chest was performed after the administration of intravenous   contrast.  Multiplanar reformatted images are provided for review.  MIP   images are provided for review. Dose modulation, iterative reconstruction,   and/or weight based adjustment of the mA/kV was utilized to reduce the   radiation dose to as low as reasonably achievable.       COMPARISON:   None.       HISTORY:   ORDERING SYSTEM PROVIDED HISTORY: Chest Pain, sob   TECHNOLOGIST PROVIDED HISTORY:   Reason for exam:->Chest Pain, sob   Decision Support Exception - unselect if not a suspected or confirmed   emergency medical condition->Emergency Medical Condition (MA)   Reason for Exam: left sided chest pain starting this AM, no SOB, hx of broken   ribs on left side   Acuity: Acute   Type of Exam: Initial       FINDINGS:   Pulmonary Arteries: Pulmonary arteries are adequately opacified for   evaluation.  No evidence of intraluminal filling defect to suggest pulmonary   embolism.  Main pulmonary artery is normal in caliber.       Mediastinum: No evidence of mediastinal lymphadenopathy.  The heart and   pericardium demonstrate no acute abnormality.  Mild bibasilar dependent   atelectasis.  There is no acute abnormality of the thoracic aorta.       Lungs/pleura:  The lungs are without acute process.  No focal consolidation or   pulmonary edema.  No evidence of pleural effusion or pneumothorax.       Upper Abdomen: Limited images of the upper abdomen are unremarkable.       Soft Tissues/Bones: No acute bone or soft tissue abnormality.  Mild   multilevel thoracic spondylosis.  Old healed right rib fractures.           Impression   No evidence of pulmonary embolism or acute pulmonary abnormality.          LABS  Labs Reviewed   CBC WITH AUTO DIFFERENTIAL - Abnormal; Notable for the following components:       Result Value    .5 (*)     MCH 35.4 (*)     All other components within normal limits    Narrative:     Performed at:  [place] [address] [facility]   Phone [phone]   COMPREHENSIVE METABOLIC PANEL W/ REFLEX TO MG FOR LOW K - Abnormal; Notable for the following components:    Glucose 106 (*)     CREATININE <0.5 (*)     All other components within normal limits    Narrative:     Performed at:  [place] [address] [address]   Phone [phone]   TROPONIN    Narrative:     Performed at:  [place] [address] [address]   Phone [phone]    Narrative:     Performed at:  [place] [address] [facility]   Phone [phone]   URINALYSIS       PROCEDURES  Unless otherwise noted below, none  Procedures      MDM  MDM  Number of Diagnoses or Management Options     Amount and/or Complexity of Data Reviewed  Clinical lab tests: ordered and reviewed  Tests in the radiology section of CPT®: ordered and reviewed  Tests in the medicine section of CPT®: reviewed and ordered  Decide to obtain previous medical records or to obtain history from someone other than the patient: yes  Review and summarize past medical records: yes  Discuss the patient with other providers: yes  Independent visualization of images, tracings, or specimens: yes      Patient is a 59-year-old male with a history of tobacco use returning to the emergency department for evaluation of chest pain. He was seen last Saturday which she had an evaluation including a CTA of the chest which was unremarkable. At that time he was calculated a heart score of 4 and was recommend he be admitted for cardiac evaluation. He left at that time and now returns due to continuation of the pain. He states he has no establish care no prior cardiac evaluation. Chest pain is intermittent. On arrival to the ED vitals within normal limits. Patient received nitro and aspirin in route via EMS. Chest pain is nonreproducible on exam.  For to attending note for EKG interpretation. The lab work demonstrates troponin within normal limits. BMP within normal limits. No leukocytosis. No renal injury electrolyte abnormality. HEART Score for Major Cardiac Events from [employer]  on 8/19/2021  ** All calculations should be rechecked by clinician prior to use **    RESULT SUMMARY:  4 points  Moderate Score (4-6 points)    Risk of MACE of 12-16.6%. INPUTS:  History --> 1 = Moderately suspicious  EKG --> 1 = Non-specific repolarization disturbance  Age --> 1 = 45-64  Risk factors --> 1 = 1-2 risk factors  Initial troponin --> 0 = ?normal limit    Patient has a heart score of 4. We will reach out to the hospital service to discuss admission/observation for cardiac evaluation in the morning with serial troponins. DISPOSITION  Requested admission for chest pain rule out ACS. CLINICAL IMPRESSION  1.  Chest pain, unspecified type           Ian Tapia PA-C  08/19/21 0118

## 2021-08-19 NOTE — ED NOTES
Pt in bed resting. Pt's daughter at bedside. Pt denies needs at this time. Pt remains on cardiac monitor. Pt waiting on blood results and xray and for plan of care to be established.       Aamir Honeycutt RN  08/19/21 [id number]

## 2021-09-02 ENCOUNTER — OFFICE VISIT (OUTPATIENT)
Dept: INTERNAL MEDICINE CLINIC | Age: 56
End: 2021-09-02

## 2021-09-02 VITALS
WEIGHT: 184 LBS | HEIGHT: 71 IN | SYSTOLIC BLOOD PRESSURE: 160 MMHG | DIASTOLIC BLOOD PRESSURE: 84 MMHG | BODY MASS INDEX: 25.76 KG/M2 | OXYGEN SATURATION: 97 % | HEART RATE: 98 BPM

## 2021-09-02 DIAGNOSIS — F41.8 ANXIOUS DEPRESSION: Primary | ICD-10-CM

## 2021-09-02 DIAGNOSIS — D75.89 MACROCYTOSIS WITHOUT ANEMIA: ICD-10-CM

## 2021-09-02 DIAGNOSIS — R03.0 ELEVATED BLOOD PRESSURE READING IN OFFICE WITHOUT DIAGNOSIS OF HYPERTENSION: ICD-10-CM

## 2021-09-02 PROCEDURE — 99203 OFFICE O/P NEW LOW 30 MIN: CPT | Performed by: INTERNAL MEDICINE

## 2021-09-02 RX ORDER — IBUPROFEN 200 MG
200 TABLET ORAL EVERY 6 HOURS PRN
COMMUNITY

## 2021-09-02 RX ORDER — CITALOPRAM 20 MG/1
20 TABLET ORAL DAILY
Qty: 30 TABLET | Refills: 3 | Status: SHIPPED | OUTPATIENT
Start: 2021-09-02 | End: 2021-09-16

## 2021-09-02 ASSESSMENT — PATIENT HEALTH QUESTIONNAIRE - PHQ9
SUM OF ALL RESPONSES TO PHQ QUESTIONS 1-9: 2
SUM OF ALL RESPONSES TO PHQ9 QUESTIONS 1 & 2: 2
2. FEELING DOWN, DEPRESSED OR HOPELESS: 1
SUM OF ALL RESPONSES TO PHQ QUESTIONS 1-9: 2
SUM OF ALL RESPONSES TO PHQ QUESTIONS 1-9: 2
1. LITTLE INTEREST OR PLEASURE IN DOING THINGS: 1

## 2021-09-02 NOTE — PATIENT INSTRUCTIONS
1. Chest pain with anxiety with serotonin depletion:  START taking citalopram (Celexa) 10 mg (half tablet) every evening for a week, then 20 mg (whole tablet) every evening. 2. Blood pressure elevated today at 160/84, goal less than 130/80. Relatively normal in the hospital at 137/83 but still above goal.  This may improve with citalopram.   3. Get COVID-19 vaccine.         Follow-up in two weeks

## 2021-09-02 NOTE — PROGRESS NOTES
SUBJECTIVE:   New patient. Hospitalized 8/18 - 8/19 for chest pain radiating to left shoulder described as sharp and \"stabbing\" and possibly associated with anxiety. No history of hypertension or CAD. Unknown family history. Heart score 2. EKG without REYNA elevations. Troponin x 3 normal. Exercise EKG/Echo stress test deemed low risk. Smokes < 1 ppd. Occasional alcohol. Difficulty sleeping, both initiation and maintenance. MEDICATIONS:  ibuprofen (ADVIL;MOTRIN) 200 MG tablet Take 200 mg by mouth every 6 hours as needed for Pain     LABS: 08/19/2021  WBC 5.1   HGB 14.9      .2 (H)        K 3.5      CO2 21   BUN 9   CREATININE <0.5 (L)   GLUCOSE 85     Magnesium (8/19) 2.10 mg/dL    SARS-CoV-2 NAAT (8/15) not detected    Lipid panel (8/19)   Cholesterol, Total 198    Triglycerides 61    HDL 102High     LDL Calculated 84          Echo Cardiac Stress Test Imaging (8/18) Non-diagnostic stress echocardiogram images. The study is limited and regional wall motion abnormalities cannot be evaluated or excluded. Normal exercise EKG respone. OBJECTIVE:    BP (!) 160/84   Pulse 98   Ht 5' 11\" (1.803 m)   Wt 184 lb (83.5 kg)   SpO2 97%   BMI 25.66 kg/m²   HEENT:  Oropharynx clear, no lymphadenopathy,   LUNGS:  Clear and without wheezes  HEART:  Regular rhythm, no appreciable murmur  ABD:  Benign, active bowel sounds  EXT:  No edema, pulses palpable  NEURO:  No focal neurologic deficits noted. ASSESSMENT / PLAN:   1. Chest pain with anxiety with serotonin depletion:  START taking citalopram (Celexa) 10 mg (half tablet) every evening for a week, then 20 mg (whole tablet) every evening. 2. Blood pressure elevated today at 160/84, goal less than 130/80. Relatively normal in the hospital at 137/83 but still above goal.  This may improve with citalopram.   3. Macrocytosis without anemia:  Check vitamin B12.   4. Get COVID-19 vaccine.         Follow-up in two weeks

## 2021-09-16 ENCOUNTER — OFFICE VISIT (OUTPATIENT)
Dept: INTERNAL MEDICINE CLINIC | Age: 56
End: 2021-09-16

## 2021-09-16 VITALS
BODY MASS INDEX: 25.76 KG/M2 | SYSTOLIC BLOOD PRESSURE: 158 MMHG | HEIGHT: 71 IN | OXYGEN SATURATION: 97 % | WEIGHT: 184 LBS | DIASTOLIC BLOOD PRESSURE: 84 MMHG | HEART RATE: 74 BPM

## 2021-09-16 DIAGNOSIS — I10 ESSENTIAL HYPERTENSION: ICD-10-CM

## 2021-09-16 DIAGNOSIS — F17.200 SMOKER: ICD-10-CM

## 2021-09-16 DIAGNOSIS — F41.8 ANXIOUS DEPRESSION: ICD-10-CM

## 2021-09-16 DIAGNOSIS — D75.89 MACROCYTOSIS WITHOUT ANEMIA: Primary | ICD-10-CM

## 2021-09-16 PROCEDURE — 99214 OFFICE O/P EST MOD 30 MIN: CPT | Performed by: INTERNAL MEDICINE

## 2021-09-16 RX ORDER — LISINOPRIL 10 MG/1
10 TABLET ORAL DAILY
Qty: 30 TABLET | Refills: 5 | Status: SHIPPED | OUTPATIENT
Start: 2021-09-16 | End: 2022-02-21 | Stop reason: SDUPTHER

## 2021-09-16 RX ORDER — BUSPIRONE HYDROCHLORIDE 5 MG/1
5 TABLET ORAL 3 TIMES DAILY
Qty: 90 TABLET | Refills: 0 | Status: SHIPPED | OUTPATIENT
Start: 2021-09-16 | End: 2021-10-16

## 2021-09-16 NOTE — PROGRESS NOTES
morning (before medication), write down and bring to clinic. 3. Macrocytosis without anemia:  Check vitamin B12.   4. Gradually cut back on cigarettes per day. 5. Dental abscess:  Not seen on exam.        Follow-up in two weeks  LAB:  Vitamin B12  Get COVID-19 vaccine.

## 2021-09-16 NOTE — PATIENT INSTRUCTIONS
1. Chest pain with anxiety with serotonin depletion:  Take buspirone (Buspar) 5 mg tablet THREE times per day. 2. Blood pressure elevated today at 158/84, goal less than 130/80. START taking lisinopril 10 mg tablet every morning. Check blood pressure every morning (before medication), write down and bring to clinic. 3. Macrocytosis without anemia:  Check vitamin B12.   4. Gradually cut back on cigarettes per day.       Follow-up in two weeks  LAB:  Vitamin B12  Get COVID-19 vaccine.

## 2022-02-21 NOTE — TELEPHONE ENCOUNTER
Tooth infection they are waiting on insurance card. Approved for [employer]. NKDA    Also Needs a refill on BP medication.

## 2022-02-22 RX ORDER — LISINOPRIL 10 MG/1
10 TABLET ORAL DAILY
Qty: 30 TABLET | Refills: 2 | Status: SHIPPED | OUTPATIENT
Start: 2022-02-22 | End: 2022-05-24 | Stop reason: SDUPTHER

## 2022-02-22 RX ORDER — AMOXICILLIN 500 MG/1
500 CAPSULE ORAL 2 TIMES DAILY
Qty: 14 CAPSULE | Refills: 0 | Status: SHIPPED | OUTPATIENT
Start: 2022-02-22 | End: 2022-03-01

## 2022-05-24 RX ORDER — LISINOPRIL 10 MG/1
10 TABLET ORAL DAILY
Qty: 30 TABLET | Refills: 3 | Status: SHIPPED | OUTPATIENT
Start: 2022-05-24 | End: 2022-09-23 | Stop reason: SDUPTHER

## 2022-05-24 NOTE — TELEPHONE ENCOUNTER
Requested Prescriptions     Pending Prescriptions Disp Refills    lisinopril (PRINIVIL;ZESTRIL) 10 MG tablet 30 tablet 3     Sig: Take 1 tablet by mouth daily       Follow up scheduled for 07/25/2022

## 2022-06-08 ENCOUNTER — OFFICE VISIT (OUTPATIENT)
Dept: INTERNAL MEDICINE CLINIC | Age: 57
End: 2022-06-08

## 2022-06-08 VITALS
DIASTOLIC BLOOD PRESSURE: 80 MMHG | WEIGHT: 184 LBS | BODY MASS INDEX: 25.76 KG/M2 | HEIGHT: 71 IN | SYSTOLIC BLOOD PRESSURE: 140 MMHG

## 2022-06-08 DIAGNOSIS — F41.8 ANXIOUS DEPRESSION: ICD-10-CM

## 2022-06-08 DIAGNOSIS — H61.21 OBSTRUCTION OF VENTILATION TUBE BY CERUMEN, RIGHT: Primary | ICD-10-CM

## 2022-06-08 PROCEDURE — 99213 OFFICE O/P EST LOW 20 MIN: CPT | Performed by: INTERNAL MEDICINE

## 2022-06-08 RX ORDER — SERTRALINE HYDROCHLORIDE 25 MG/1
25 TABLET, FILM COATED ORAL DAILY
Qty: 30 TABLET | Refills: 3 | Status: SHIPPED | OUTPATIENT
Start: 2022-06-08 | End: 2022-07-25

## 2022-06-08 NOTE — PROGRESS NOTES
[address] [place] [address] (:  1965) is a 55 y.o. male,, here for evaluation of the following chief complaint(s):  Ear Fullness (R ear pain onset 5 days ago ) and Depression (not on medication )         ASSESSMENT/PLAN:  1. Obstruction of ventilation tube by cerumen, right  2. Anxious depression  1. Cerumen obstruction of the right ear. Attempts were made to flush this out with warm water without success. We I did suggest that he use Debrox twice daily for about a week and then return for further flushing. 2.  History of anxiety and depression will try Zoloft at 25 mg gradually building to 50 mg per return to office as previously scheduled    No follow-ups on file. Subjective   SUBJECTIVE/OBJECTIVE:  HPI patient in complaining of right ear fullness for a week or 2. He did try over-the-counter drops and flushing without success. He states he does get some ear wax from his right ear when he uses a Q-tip but has not had to have his ears cleaned in the past.  he also mentions that he still has problems with anxiety and depression. Previously he tried 1 dose of citalopram with side effects. I suggested trying Zoloft at a 25 mg dosage and gradually build up to 50 mg. He will call if this is poorly tolerated or unsuccessful. He does have a follow-up visit scheduled with us in approximately 6 to 8-week    Review of Systems   All other systems reviewed and are negative. Objective   Physical Exam  Vitals reviewed. HENT:      Right Ear: There is impacted cerumen. Left Ear: Tympanic membrane normal.   Cardiovascular:      Rate and Rhythm: Normal rate and regular rhythm. An electronic signature was used to authenticate this note.     --Warren Ashley MD

## 2022-07-25 ENCOUNTER — OFFICE VISIT (OUTPATIENT)
Dept: INTERNAL MEDICINE CLINIC | Age: 57
End: 2022-07-25

## 2022-07-25 VITALS
HEART RATE: 101 BPM | SYSTOLIC BLOOD PRESSURE: 120 MMHG | DIASTOLIC BLOOD PRESSURE: 74 MMHG | BODY MASS INDEX: 25.76 KG/M2 | OXYGEN SATURATION: 94 % | HEIGHT: 71 IN | WEIGHT: 184 LBS

## 2022-07-25 DIAGNOSIS — F41.8 ANXIOUS DEPRESSION: Primary | ICD-10-CM

## 2022-07-25 DIAGNOSIS — I10 ESSENTIAL HYPERTENSION: ICD-10-CM

## 2022-07-25 PROCEDURE — 99213 OFFICE O/P EST LOW 20 MIN: CPT | Performed by: INTERNAL MEDICINE

## 2022-07-25 ASSESSMENT — PATIENT HEALTH QUESTIONNAIRE - PHQ9
7. TROUBLE CONCENTRATING ON THINGS, SUCH AS READING THE NEWSPAPER OR WATCHING TELEVISION: 1
SUM OF ALL RESPONSES TO PHQ QUESTIONS 1-9: 14
4. FEELING TIRED OR HAVING LITTLE ENERGY: 2
3. TROUBLE FALLING OR STAYING ASLEEP: 2
9. THOUGHTS THAT YOU WOULD BE BETTER OFF DEAD, OR OF HURTING YOURSELF: 0
5. POOR APPETITE OR OVEREATING: 2
6. FEELING BAD ABOUT YOURSELF - OR THAT YOU ARE A FAILURE OR HAVE LET YOURSELF OR YOUR FAMILY DOWN: 2
1. LITTLE INTEREST OR PLEASURE IN DOING THINGS: 2
SUM OF ALL RESPONSES TO PHQ QUESTIONS 1-9: 14
10. IF YOU CHECKED OFF ANY PROBLEMS, HOW DIFFICULT HAVE THESE PROBLEMS MADE IT FOR YOU TO DO YOUR WORK, TAKE CARE OF THINGS AT HOME, OR GET ALONG WITH OTHER PEOPLE: 2
8. MOVING OR SPEAKING SO SLOWLY THAT OTHER PEOPLE COULD HAVE NOTICED. OR THE OPPOSITE, BEING SO FIGETY OR RESTLESS THAT YOU HAVE BEEN MOVING AROUND A LOT MORE THAN USUAL: 1
SUM OF ALL RESPONSES TO PHQ9 QUESTIONS 1 & 2: 4
2. FEELING DOWN, DEPRESSED OR HOPELESS: 2

## 2022-07-25 ASSESSMENT — COLUMBIA-SUICIDE SEVERITY RATING SCALE - C-SSRS
6. HAVE YOU EVER DONE ANYTHING, STARTED TO DO ANYTHING, OR PREPARED TO DO ANYTHING TO END YOUR LIFE?: NO
1. WITHIN THE PAST MONTH, HAVE YOU WISHED YOU WERE DEAD OR WISHED YOU COULD GO TO SLEEP AND NOT WAKE UP?: NO
2. HAVE YOU ACTUALLY HAD ANY THOUGHTS OF KILLING YOURSELF?: NO

## 2022-07-25 NOTE — PROGRESS NOTES
SUBJECTIVE:  Follow up from 6/8 (Dr Aman Clark) for anxious depression on sertraline 25 mg daily with minimal improvement in anxiety. Some reduction in depression. Insomnia with difficulty falling asleep and staying asleep. Some improvement with melatonin. MEDICATIONS:  sertraline (ZOLOFT) 25 MG tablet Take 1 tablet by mouth daily   lisinopril (PRINIVIL;ZESTRIL) 10 MG tablet Take 1 tablet by mouth daily   ibuprofen (ADVIL;MOTRIN) 200 MG tablet Take 200 mg by mouth every 6 hours as needed          Lab Results   Component Value Date    WBC 5.1 08/19/2021    HGB 14.9 08/19/2021     08/19/2021    .2 (H) 08/19/2021     Lab Results   Component Value Date     08/19/2021    K 3.5 08/19/2021     08/19/2021    CO2 21 08/19/2021    BUN 9 08/19/2021    CREATININE <0.5 (L) 08/19/2021    GLUCOSE 85 08/19/2021       OBJECTIVE:    /74 (Site: Left Upper Arm, Position: Sitting, Cuff Size: Large Adult)   Pulse (!) 101   Ht 5' 11\" (1.803 m)   Wt 184 lb (83.5 kg)   SpO2 94%   BMI 25.66 kg/m²   HEENT:  Oropharynx clear, no lymphadenopathy,   LUNGS:  Clear and without wheezes  HEART:  Regular rhythm, no appreciable murmur  ABD:  Benign, active bowel sounds  EXT:  No edema, pulses palpable  NEURO:  No focal neurologic deficits noted. ASSESSMENT / PLAN:   Anxious depression:   Increase the dose of sertraline (Zoloft) from 25 mg to 50 mg every morning. The usual therapeutic dose is 100 - 200 mg per day. Blood pressure good today at 120/74. Continue lisinopril 10 mg tablet every morning.           Follow-up in three weeks

## 2022-08-29 ENCOUNTER — OFFICE VISIT (OUTPATIENT)
Dept: INTERNAL MEDICINE CLINIC | Age: 57
End: 2022-08-29

## 2022-08-29 VITALS
WEIGHT: 184 LBS | SYSTOLIC BLOOD PRESSURE: 140 MMHG | BODY MASS INDEX: 25.76 KG/M2 | HEIGHT: 71 IN | OXYGEN SATURATION: 95 % | HEART RATE: 110 BPM | DIASTOLIC BLOOD PRESSURE: 80 MMHG

## 2022-08-29 DIAGNOSIS — F41.8 ANXIOUS DEPRESSION: Primary | ICD-10-CM

## 2022-08-29 DIAGNOSIS — I10 ESSENTIAL HYPERTENSION: ICD-10-CM

## 2022-08-29 PROCEDURE — 99213 OFFICE O/P EST LOW 20 MIN: CPT | Performed by: INTERNAL MEDICINE

## 2022-08-29 RX ORDER — SERTRALINE HYDROCHLORIDE 100 MG/1
100 TABLET, FILM COATED ORAL DAILY
Qty: 30 TABLET | Refills: 5 | Status: SHIPPED | OUTPATIENT
Start: 2022-08-29

## 2022-08-29 NOTE — PATIENT INSTRUCTIONS
Anxious depression:  Increase the dose of sertraline (Zoloft) from 50 mg to 100 mg every morning. The usual therapeutic dose is 100 - 200 mg per day. If no improvement, we will plan to change to venlafaxine (Effexor). Blood pressure borderline today at 140/80. Continue lisinopril 10 mg tablet every morning.         Follow-up four weeks

## 2022-08-29 NOTE — PROGRESS NOTES
SUBJECTIVE:   Follow up from 7/25 for anxious depression, increased Zoloft from 25 to 50 mg daily. Continued insomnia with difficulty falling asleep and staying asleep. Problems sleeping since he was a teenager. He has been up since 2:30 am this morning. Typically sleeps about 4 hours in 24 hours. MEDICATIONS:  sertraline (ZOLOFT) 50 MG tablet Take 1 tablet by mouth in the morning. lisinopril (PRINIVIL;ZESTRIL) 10 MG tablet Take 1 tablet by mouth daily   ibuprofen (ADVIL;MOTRIN) 200 MG tablet Take 200 mg by mouth every 6 hours as needed for Pain       Lab Results   Component Value Date    WBC 5.1 08/19/2021    HGB 14.9 08/19/2021     08/19/2021    .2 (H) 08/19/2021     Lab Results   Component Value Date     08/19/2021    K 3.5 08/19/2021     08/19/2021    CO2 21 08/19/2021    BUN 9 08/19/2021    CREATININE <0.5 (L) 08/19/2021    GLUCOSE 85 08/19/2021       OBJECTIVE:    BP (!) 140/80 (Site: Left Upper Arm, Position: Sitting)   Pulse (!) 110   Ht 5' 11\" (1.803 m)   Wt 184 lb (83.5 kg)   SpO2 95%   BMI 25.66 kg/m²   HEENT:  Oropharynx clear, no lymphadenopathy,   LUNGS:  Clear and without wheezes  HEART:  Regular rhythm, no appreciable murmur  ABD:  Benign, active bowel sounds  EXT:  No edema, pulses palpable  NEURO:  No focal neurologic deficits noted. ASSESSMENT / PLAN:   Anxious depression:  Increase the dose of sertraline (Zoloft) from 50 mg to 100 mg every morning. The usual therapeutic dose is 100 - 200 mg per day. If no improvement, we will plan to change to venlafaxine (Effexor). Blood pressure borderline today at 140/80. Continue lisinopril 10 mg tablet every morning.         Follow-up four weeks

## 2022-09-08 RX ORDER — HYDROXYZINE HYDROCHLORIDE 25 MG/1
25 TABLET, FILM COATED ORAL 4 TIMES DAILY PRN
Qty: 120 TABLET | Refills: 0 | Status: SHIPPED | OUTPATIENT
Start: 2022-09-08 | End: 2022-09-18

## 2022-09-23 RX ORDER — LISINOPRIL 10 MG/1
10 TABLET ORAL DAILY
Qty: 30 TABLET | Refills: 3 | Status: SHIPPED | OUTPATIENT
Start: 2022-09-23

## 2022-10-19 ENCOUNTER — OFFICE VISIT (OUTPATIENT)
Dept: INTERNAL MEDICINE CLINIC | Age: 57
End: 2022-10-19

## 2022-10-19 VITALS
SYSTOLIC BLOOD PRESSURE: 118 MMHG | WEIGHT: 184 LBS | HEIGHT: 71 IN | BODY MASS INDEX: 25.76 KG/M2 | DIASTOLIC BLOOD PRESSURE: 64 MMHG

## 2022-10-19 DIAGNOSIS — I10 ESSENTIAL HYPERTENSION: Primary | ICD-10-CM

## 2022-10-19 DIAGNOSIS — F41.8 ANXIOUS DEPRESSION: ICD-10-CM

## 2022-10-19 DIAGNOSIS — R39.198 URINARY DYSFUNCTION: ICD-10-CM

## 2022-10-19 PROCEDURE — 99214 OFFICE O/P EST MOD 30 MIN: CPT | Performed by: INTERNAL MEDICINE

## 2022-10-19 RX ORDER — TRAZODONE HYDROCHLORIDE 50 MG/1
50 TABLET ORAL NIGHTLY PRN
Qty: 30 TABLET | Refills: 5 | Status: SHIPPED | OUTPATIENT
Start: 2022-10-19

## 2022-10-19 ASSESSMENT — ENCOUNTER SYMPTOMS
SHORTNESS OF BREATH: 0
COUGH: 0

## 2022-10-19 NOTE — PROGRESS NOTES
[address] [place] [address] (:  1965) is a 55 y.o. male,, here for evaluation of the following chief complaint(s):  Depression (Doing well on the zoloft. Taking 1.5 daily ) and Anxiety (Atarax did not help, quit taking )         ASSESSMENT/PLAN:  1. Essential hypertension  2. Anxious depression  1. Hypertension blood pressures were running low today with systolics in the  range. He does check his blood pressure regularly at home and usually runs  systolic and his wife uses the same cuff with realistic numbers. He denies any orthostatic symptoms today. He probably has a component of atherosclerosis affecting his blood pressure in his right arm. Will maintain on same blood pressure medicines at this time unless he becomes symptomatic. 2.  Anxiety and depression. He will continue the Zoloft 75 mg daily. he does sleep poorly and decision was made to try trazodone starting at anywhere from 12-1/2 to 25 mg at bedtime to see if it will help sleep patterns and daytime anxiety. 3.  He did mention problems with urinary frequency at nighttime and slow stream during the daytime. I did discuss with him about over-the-counter medicines and consideration of seeing a urologist in our clinic. He will consider this. Return in about 3 months (around 2023). Subjective   SUBJECTIVE/OBJECTIVE:  HPI patient in for follow-up of anxiety and depression as well as follow-up of his hypertension. He denies any new health concerns states that he has been feeling well from the depression standpoint. He states he is taking 75 mg of sertraline daily with adequate success. However he still states he feels anxious. He was prescribed Atarax without significant improvement. He does check his blood pressure at home and usually averages 1 06-9 40 systolic. Review of Systems   Respiratory:  Negative for cough and shortness of breath. Cardiovascular:  Negative for chest pain and palpitations.    All other systems reviewed and are negative. Objective   Physical Exam  Vitals reviewed. Constitutional:       Appearance: Normal appearance. Neck:      Vascular: No carotid bruit. Cardiovascular:      Rate and Rhythm: Normal rate and regular rhythm. Heart sounds: Normal heart sounds. Comments: Occasional ectopic beats noted    His right radial pulse was difficult to palpate. He did have a week antecubital pulse on the right side and stronger pulses on the left  Pulmonary:      Effort: Pulmonary effort is normal. No respiratory distress. Breath sounds: Normal breath sounds. Abdominal:      General: Abdomen is flat. Bowel sounds are normal.      Palpations: Abdomen is soft. Musculoskeletal:         General: No swelling. Cervical back: Normal range of motion and neck supple. Neurological:      Mental Status: He is alert. An electronic signature was used to authenticate this note.     --Velia Toussaint MD

## 2023-01-18 RX ORDER — LISINOPRIL 10 MG/1
10 TABLET ORAL DAILY
Qty: 30 TABLET | Refills: 5 | Status: SHIPPED | OUTPATIENT
Start: 2023-01-18

## 2023-01-18 NOTE — TELEPHONE ENCOUNTER
Requested Prescriptions     Pending Prescriptions Disp Refills    lisinopril (PRINIVIL;ZESTRIL) 10 MG tablet 30 tablet 5     Sig: Take 1 tablet by mouth daily

## 2023-02-08 ENCOUNTER — OFFICE VISIT (OUTPATIENT)
Dept: INTERNAL MEDICINE CLINIC | Age: 58
End: 2023-02-08

## 2023-02-08 VITALS
BODY MASS INDEX: 25.62 KG/M2 | OXYGEN SATURATION: 96 % | SYSTOLIC BLOOD PRESSURE: 136 MMHG | WEIGHT: 183 LBS | HEART RATE: 92 BPM | HEIGHT: 71 IN | DIASTOLIC BLOOD PRESSURE: 76 MMHG

## 2023-02-08 DIAGNOSIS — I10 ESSENTIAL HYPERTENSION: Primary | ICD-10-CM

## 2023-02-08 DIAGNOSIS — F41.8 ANXIOUS DEPRESSION: ICD-10-CM

## 2023-02-08 PROCEDURE — 90471 IMMUNIZATION ADMIN: CPT | Performed by: INTERNAL MEDICINE

## 2023-02-08 PROCEDURE — 3075F SYST BP GE 130 - 139MM HG: CPT | Performed by: INTERNAL MEDICINE

## 2023-02-08 PROCEDURE — 90732 PPSV23 VACC 2 YRS+ SUBQ/IM: CPT | Performed by: INTERNAL MEDICINE

## 2023-02-08 PROCEDURE — 3078F DIAST BP <80 MM HG: CPT | Performed by: INTERNAL MEDICINE

## 2023-02-08 PROCEDURE — 99214 OFFICE O/P EST MOD 30 MIN: CPT | Performed by: INTERNAL MEDICINE

## 2023-02-08 RX ORDER — TRAZODONE HYDROCHLORIDE 100 MG/1
100 TABLET ORAL NIGHTLY PRN
Qty: 30 TABLET | Refills: 5 | Status: SHIPPED | OUTPATIENT
Start: 2023-02-08

## 2023-02-08 RX ORDER — SERTRALINE HYDROCHLORIDE 100 MG/1
100 TABLET, FILM COATED ORAL DAILY
Qty: 30 TABLET | Refills: 5 | Status: SHIPPED | OUTPATIENT
Start: 2023-02-08

## 2023-02-08 ASSESSMENT — ENCOUNTER SYMPTOMS
COUGH: 0
SHORTNESS OF BREATH: 0

## 2023-06-06 ENCOUNTER — OFFICE VISIT (OUTPATIENT)
Dept: INTERNAL MEDICINE CLINIC | Age: 58
End: 2023-06-06

## 2023-06-06 VITALS
HEART RATE: 98 BPM | BODY MASS INDEX: 26.04 KG/M2 | SYSTOLIC BLOOD PRESSURE: 110 MMHG | OXYGEN SATURATION: 96 % | DIASTOLIC BLOOD PRESSURE: 60 MMHG | HEIGHT: 71 IN | WEIGHT: 186 LBS

## 2023-06-06 DIAGNOSIS — F40.11 GENERALIZED SOCIAL PHOBIA: ICD-10-CM

## 2023-06-06 DIAGNOSIS — I10 HYPERTENSION, UNSPECIFIED TYPE: ICD-10-CM

## 2023-06-06 DIAGNOSIS — F41.1 GAD (GENERALIZED ANXIETY DISORDER): Primary | ICD-10-CM

## 2023-06-06 PROCEDURE — 3074F SYST BP LT 130 MM HG: CPT | Performed by: FAMILY MEDICINE

## 2023-06-06 PROCEDURE — 3078F DIAST BP <80 MM HG: CPT | Performed by: FAMILY MEDICINE

## 2023-06-06 PROCEDURE — 99213 OFFICE O/P EST LOW 20 MIN: CPT | Performed by: FAMILY MEDICINE

## 2023-06-06 RX ORDER — LISINOPRIL 10 MG/1
10 TABLET ORAL DAILY
Qty: 30 TABLET | Refills: 5 | Status: SHIPPED | OUTPATIENT
Start: 2023-06-06

## 2023-06-06 RX ORDER — SERTRALINE HYDROCHLORIDE 100 MG/1
TABLET, FILM COATED ORAL
Qty: 60 TABLET | Refills: 5 | Status: SHIPPED | OUTPATIENT
Start: 2023-06-06

## 2023-07-17 NOTE — TELEPHONE ENCOUNTER
Requested Prescriptions     Pending Prescriptions Disp Refills    traZODone (DESYREL) 100 MG tablet 30 tablet 5     Sig: Take 1 tablet by mouth nightly as needed for Sleep    Pt seen in [facility] pending

## 2023-07-18 RX ORDER — TRAZODONE HYDROCHLORIDE 100 MG/1
100 TABLET ORAL NIGHTLY PRN
Qty: 30 TABLET | Refills: 5 | Status: SHIPPED | OUTPATIENT
Start: 2023-07-18

## 2023-07-18 NOTE — TELEPHONE ENCOUNTER
Requested Prescriptions     Pending Prescriptions Disp Refills    traZODone (DESYREL) 100 MG tablet [Pharmacy Med Name: traZODone HCl Oral Tablet 100 MG] 30 tablet 0     Sig: TAKE 1 TABLET BY MOUTH NIGHTLY AS NEEDED FOR SLEEP      Follow up scheduled for December

## 2023-12-05 NOTE — TELEPHONE ENCOUNTER
Requested Prescriptions     Pending Prescriptions Disp Refills    lisinopril (PRINIVIL;ZESTRIL) 10 MG tablet 90 tablet 3     Sig: Take 1 tablet by mouth daily    sertraline (ZOLOFT) 100 MG tablet 180 tablet 3     Si po qd    traZODone (DESYREL) 100 MG tablet 90 tablet 3     Sig: Take 1 tablet by mouth nightly as needed for Sleep    Asking for 90 day supply

## 2023-12-06 RX ORDER — TRAZODONE HYDROCHLORIDE 100 MG/1
100 TABLET ORAL NIGHTLY PRN
Qty: 90 TABLET | Refills: 3 | Status: SHIPPED | OUTPATIENT
Start: 2023-12-06

## 2023-12-06 RX ORDER — SERTRALINE HYDROCHLORIDE 100 MG/1
TABLET, FILM COATED ORAL
Qty: 180 TABLET | Refills: 3 | Status: SHIPPED | OUTPATIENT
Start: 2023-12-06

## 2023-12-06 RX ORDER — LISINOPRIL 10 MG/1
10 TABLET ORAL DAILY
Qty: 90 TABLET | Refills: 3 | Status: SHIPPED | OUTPATIENT
Start: 2023-12-06

## 2024-01-30 ENCOUNTER — TELEPHONE (OUTPATIENT)
Dept: INTERNAL MEDICINE CLINIC | Age: 59
End: 2024-01-30

## 2024-01-30 ENCOUNTER — SCHEDULED TELEPHONE ENCOUNTER (OUTPATIENT)
Dept: INTERNAL MEDICINE CLINIC | Age: 59
End: 2024-01-30

## 2024-01-30 DIAGNOSIS — F41.1 GAD (GENERALIZED ANXIETY DISORDER): Primary | ICD-10-CM

## 2024-01-30 DIAGNOSIS — F40.11 GENERALIZED SOCIAL PHOBIA: ICD-10-CM

## 2024-01-30 PROCEDURE — 99421 OL DIG E/M SVC 5-10 MIN: CPT | Performed by: FAMILY MEDICINE

## 2024-01-30 RX ORDER — TRAZODONE HYDROCHLORIDE 100 MG/1
100 TABLET ORAL NIGHTLY
Qty: 30 TABLET | Refills: 2 | Status: CANCELLED | OUTPATIENT
Start: 2024-01-30

## 2024-01-30 RX ORDER — TRAZODONE HYDROCHLORIDE 100 MG/1
100 TABLET ORAL NIGHTLY PRN
Qty: 30 TABLET | Refills: 2 | Status: SHIPPED | OUTPATIENT
Start: 2024-01-30

## 2024-01-30 NOTE — TELEPHONE ENCOUNTER
Mr. Ramos called in stating he is terrified to leave his house. States the last time he left his house was to come here for his last appointment. He is asking if you can call him.     We can make this a virtual visit if you are ok with this?

## 2024-01-30 NOTE — PROGRESS NOTES
MD Phone Call:    10 min televisit- pt was unable to come into appt due to severe, overwhelming anxiety so I called today.  Unable to leave house since last OV.  Zoloft 200mg qd not helping.  Ongoing sx's since age mid 40s.    Mood ok, denies depression, SI.  Just constant overwhelming anxiety.  Wife helps  with obtaining necessary household supplies, has a dog that is good  and helps.  Lengthy discussion.        Assessment/plan:     Diagnoses and all orders for this visit:    ADELINA (generalized anxiety disorder)    Generalized social phobia    Plan-  will start trial of trazodone 100mg qhs.  Cont zoloft.  Will f/u in 4 wks.

## 2024-04-09 ENCOUNTER — TELEPHONE (OUTPATIENT)
Dept: INTERNAL MEDICINE CLINIC | Age: 59
End: 2024-04-09

## 2024-04-09 ENCOUNTER — OFFICE VISIT (OUTPATIENT)
Dept: INTERNAL MEDICINE CLINIC | Age: 59
End: 2024-04-09

## 2024-04-09 VITALS
HEIGHT: 71 IN | SYSTOLIC BLOOD PRESSURE: 110 MMHG | BODY MASS INDEX: 23.8 KG/M2 | DIASTOLIC BLOOD PRESSURE: 68 MMHG | OXYGEN SATURATION: 95 % | HEART RATE: 98 BPM | WEIGHT: 170 LBS

## 2024-04-09 DIAGNOSIS — F40.11 GENERALIZED SOCIAL PHOBIA: ICD-10-CM

## 2024-04-09 DIAGNOSIS — F41.1 GAD (GENERALIZED ANXIETY DISORDER): ICD-10-CM

## 2024-04-09 DIAGNOSIS — F40.10 SOCIAL PHOBIA: Primary | ICD-10-CM

## 2024-04-09 DIAGNOSIS — F41.0 SEVERE ANXIETY WITH PANIC: ICD-10-CM

## 2024-04-09 PROCEDURE — 99213 OFFICE O/P EST LOW 20 MIN: CPT | Performed by: FAMILY MEDICINE

## 2024-04-09 PROCEDURE — 3074F SYST BP LT 130 MM HG: CPT | Performed by: FAMILY MEDICINE

## 2024-04-09 PROCEDURE — 3078F DIAST BP <80 MM HG: CPT | Performed by: FAMILY MEDICINE

## 2024-04-09 RX ORDER — PROPRANOLOL HYDROCHLORIDE 20 MG/1
TABLET ORAL
Qty: 12 TABLET | Refills: 1 | Status: CANCELLED | OUTPATIENT
Start: 2024-04-09

## 2024-04-09 RX ORDER — PROPRANOLOL HYDROCHLORIDE 20 MG/1
TABLET ORAL
Qty: 12 TABLET | Refills: 1 | Status: SHIPPED | OUTPATIENT
Start: 2024-04-09

## 2024-04-09 NOTE — PROGRESS NOTES
Chief Complaint   Patient presents with    Anxiety     Follow up, last seen here 06/06/2023. Telephone visit on 01/30/2024 due to anxiety     Other     States he is doing great on the zoloft      Taking meds as per below,doing much better with severe anxiety, social phobia.  Still never leaves house, very difficult coming in here today  Meds have helped with mood/anxiety  Would like to occ leave house ie go to [facility]          Patient Active Problem List   Diagnosis    Non-cardiac chest pain    Hypertension    Generalized social phobia    ADELINA (generalized anxiety disorder)     Current Outpatient Medications on File Prior to Visit   Medication Sig Dispense Refill    traZODone (DESYREL) 100 MG tablet Take 1 tablet by mouth nightly as needed for Sleep 30 tablet 2    lisinopril (PRINIVIL;ZESTRIL) 10 MG tablet Take 1 tablet by mouth daily 90 tablet 3    sertraline (ZOLOFT) 100 MG tablet 2 po qd 180 tablet 3     No current facility-administered medications on file prior to visit.     /68 (Site: Left Upper Arm, Position: Sitting)   Pulse 98   Ht 1.803 m (5' 11\")   Wt 77.1 kg (170 lb)   SpO2 95%   BMI 23.71 kg/m²     A+Ox4, NAD, WD/WN  Conj clear, no icterus  OP clear  Neck supple, no LAD  Lungs CTA B  CV: RRR, no M/R/G, nl S1S  Abd: soft, NT/ND  Extr: No edema  Skin: warm dry, no rash no obvious lesions  Assessment/plan:     Alejandro was seen today for anxiety and other.    Diagnoses and all orders for this visit:    Social phobia    Severe anxiety with panic    Generalized social phobia    Continue meds noted   Add b-blocker to take 30 mins prior to going to store or elsewhere in public  Pt educ      Other orders  -     propranolol (INDERAL) 20 MG tablet; 1 po qd prn 30 mins prior to event.

## 2024-04-09 NOTE — TELEPHONE ENCOUNTER
Requested Prescriptions     Pending Prescriptions Disp Refills    propranolol (INDERAL) 20 MG tablet 12 tablet 1     Si po qd prn 30 mins prior to event.    Called in to [facility]

## 2024-10-16 RX ORDER — SERTRALINE HYDROCHLORIDE 100 MG/1
TABLET, FILM COATED ORAL
Qty: 180 TABLET | Refills: 1 | Status: SHIPPED | OUTPATIENT
Start: 2024-10-16

## 2024-10-16 RX ORDER — TRAZODONE HYDROCHLORIDE 100 MG/1
100 TABLET ORAL NIGHTLY PRN
Qty: 90 TABLET | Refills: 1 | Status: SHIPPED | OUTPATIENT
Start: 2024-10-16

## 2024-10-16 NOTE — TELEPHONE ENCOUNTER
Requested Prescriptions     Pending Prescriptions Disp Refills    traZODone (DESYREL) 100 MG tablet 90 tablet 1     Sig: Take 1 tablet by mouth nightly as needed for Sleep    sertraline (ZOLOFT) 100 MG tablet 180 tablet 1     Si po qd    Follow up scheduled 2024

## 2024-11-08 NOTE — TELEPHONE ENCOUNTER
Requested Prescriptions     Pending Prescriptions Disp Refills    lisinopril (PRINIVIL;ZESTRIL) 10 MG tablet [Pharmacy Med Name: Lisinopril Oral Tablet 10 MG] 90 tablet 0     Sig: TAKE 1 TABLET BY MOUTH EVERY DAY

## 2024-11-09 RX ORDER — LISINOPRIL 10 MG/1
10 TABLET ORAL DAILY
Qty: 90 TABLET | Refills: 0 | Status: SHIPPED | OUTPATIENT
Start: 2024-11-09

## 2025-02-06 RX ORDER — LISINOPRIL 10 MG/1
10 TABLET ORAL DAILY
Qty: 90 TABLET | Refills: 0 | Status: SHIPPED | OUTPATIENT
Start: 2025-02-06

## 2025-02-06 NOTE — TELEPHONE ENCOUNTER
Requested Prescriptions     Pending Prescriptions Disp Refills    lisinopril (PRINIVIL;ZESTRIL) 10 MG tablet [Pharmacy Med Name: Lisinopril Oral Tablet 10 MG] 90 tablet 0     Sig: TAKE 1 TABLET BY MOUTH EVERY DAY   Due for labs

## 2025-04-08 ENCOUNTER — OFFICE VISIT (OUTPATIENT)
Dept: INTERNAL MEDICINE CLINIC | Age: 60
End: 2025-04-08

## 2025-04-08 VITALS
SYSTOLIC BLOOD PRESSURE: 119 MMHG | HEIGHT: 71 IN | HEART RATE: 89 BPM | DIASTOLIC BLOOD PRESSURE: 85 MMHG | WEIGHT: 162 LBS | BODY MASS INDEX: 22.68 KG/M2 | OXYGEN SATURATION: 97 %

## 2025-04-08 DIAGNOSIS — F40.10 SOCIAL PHOBIA: ICD-10-CM

## 2025-04-08 DIAGNOSIS — F41.9 SEVERE ANXIETY: Primary | ICD-10-CM

## 2025-04-08 DIAGNOSIS — I10 PRIMARY HYPERTENSION: ICD-10-CM

## 2025-04-08 PROCEDURE — 3074F SYST BP LT 130 MM HG: CPT | Performed by: FAMILY MEDICINE

## 2025-04-08 PROCEDURE — 3079F DIAST BP 80-89 MM HG: CPT | Performed by: FAMILY MEDICINE

## 2025-04-08 PROCEDURE — 99214 OFFICE O/P EST MOD 30 MIN: CPT | Performed by: FAMILY MEDICINE

## 2025-04-08 RX ORDER — SERTRALINE HYDROCHLORIDE 100 MG/1
TABLET, FILM COATED ORAL
Qty: 180 TABLET | Refills: 3 | Status: SHIPPED | OUTPATIENT
Start: 2025-04-08

## 2025-04-08 RX ORDER — PROPRANOLOL HCL 20 MG
TABLET ORAL
Qty: 30 TABLET | Refills: 3 | Status: SHIPPED | OUTPATIENT
Start: 2025-04-08

## 2025-04-08 NOTE — PROGRESS NOTES
Chief Complaint   Patient presents with    Anxiety     States he is doing well on all medications. Only using trazodone every once in a while.      Pt is in for f/u re severe ADELINA and social anxiety; doing well no acute c/o.  Tolerating meds, effective, now currently sx's under much better control than historically.    Has had some intentional wt loss due to eating small amounts more frequently  States has been  a good year since last visit , BPs  Takes propanolol prior to social situations and helps/effective  Taking trazodone infrequently.        Wt Readings from Last 3 Encounters:   04/08/25 73.5 kg (162 lb)   04/09/24 77.1 kg (170 lb)   06/06/23 84.4 kg (186 lb)         Past Medical History:   Diagnosis Date    Adopted     DDD (degenerative disc disease)      Patient Active Problem List   Diagnosis    Non-cardiac chest pain    Hypertension    Social phobia    ADELINA (generalized anxiety disorder)    Severe anxiety     Current Outpatient Medications on File Prior to Visit   Medication Sig Dispense Refill    lisinopril (PRINIVIL;ZESTRIL) 10 MG tablet TAKE 1 TABLET BY MOUTH EVERY DAY 90 tablet 0    traZODone (DESYREL) 100 MG tablet Take 1 tablet by mouth nightly as needed for Sleep (Patient not taking: Reported on 4/8/2025) 90 tablet 1     No current facility-administered medications on file prior to visit.     /85 (BP Site: Left Upper Arm, Patient Position: Sitting, BP Cuff Size: Medium Adult)   Pulse 89   Ht 1.803 m (5' 11\")   Wt 73.5 kg (162 lb)   SpO2 97%   BMI 22.59 kg/m²     A+Ox4, NAD, WD/WN  Conj clear, no icterus  OP clear  Neck supple, no LAD  Lungs CTA B  CV: RRR, no M/R/G, nl S1S  Abd: soft, NT/ND  Extr: No edema  Skin: warm dry, no rash no obvious lesions    Assessment/plan:     Alejandro was seen today for anxiety.    Diagnoses and all orders for this visit:    Severe anxiety    Social phobia    Primary hypertension    Other orders  -     propranolol (INDERAL) 20 MG tablet; 1 po qd prn 30 mins

## 2025-04-16 ENCOUNTER — TELEPHONE (OUTPATIENT)
Dept: INTERNAL MEDICINE CLINIC | Age: 60
End: 2025-04-16

## 2025-04-16 NOTE — TELEPHONE ENCOUNTER
Patient called and has had rectal bleeding for the last 5 days. Denies pain with BM. No blood in stool just on TP and in the toilet bowl. Its is bright red. Patient does say that he has some burning when he wipes. Sometimes he is constipated and others its normal stool.     Also asking if he can get an RX for Viagra.?

## 2025-04-17 ENCOUNTER — OFFICE VISIT (OUTPATIENT)
Dept: INTERNAL MEDICINE CLINIC | Age: 60
End: 2025-04-17

## 2025-04-17 VITALS
BODY MASS INDEX: 22.68 KG/M2 | OXYGEN SATURATION: 94 % | WEIGHT: 162 LBS | SYSTOLIC BLOOD PRESSURE: 121 MMHG | HEART RATE: 78 BPM | HEIGHT: 71 IN | DIASTOLIC BLOOD PRESSURE: 77 MMHG

## 2025-04-17 DIAGNOSIS — I10 PRIMARY HYPERTENSION: ICD-10-CM

## 2025-04-17 DIAGNOSIS — F40.10 SOCIAL PHOBIA: ICD-10-CM

## 2025-04-17 DIAGNOSIS — K62.5 RECTAL BLEEDING: ICD-10-CM

## 2025-04-17 DIAGNOSIS — K62.5 RECTAL BLEEDING: Primary | ICD-10-CM

## 2025-04-17 DIAGNOSIS — F41.9 SEVERE ANXIETY: ICD-10-CM

## 2025-04-17 LAB
BASOPHILS # BLD: 0.1 K/UL (ref 0–0.2)
BASOPHILS NFR BLD: 1.3 %
DEPRECATED RDW RBC AUTO: 15.2 % (ref 12.4–15.4)
EOSINOPHIL # BLD: 0.2 K/UL (ref 0–0.6)
EOSINOPHIL NFR BLD: 4.3 %
HCT VFR BLD AUTO: 47.6 % (ref 40.5–52.5)
HGB BLD-MCNC: 16.3 G/DL (ref 13.5–17.5)
LYMPHOCYTES # BLD: 2.5 K/UL (ref 1–5.1)
LYMPHOCYTES NFR BLD: 49.6 %
MCH RBC QN AUTO: 35.7 PG (ref 26–34)
MCHC RBC AUTO-ENTMCNC: 34.2 G/DL (ref 31–36)
MCV RBC AUTO: 104.3 FL (ref 80–100)
MONOCYTES # BLD: 0.3 K/UL (ref 0–1.3)
MONOCYTES NFR BLD: 5.3 %
NEUTROPHILS # BLD: 2 K/UL (ref 1.7–7.7)
NEUTROPHILS NFR BLD: 39.5 %
PLATELET # BLD AUTO: 232 K/UL (ref 135–450)
PMV BLD AUTO: 8.3 FL (ref 5–10.5)
RBC # BLD AUTO: 4.57 M/UL (ref 4.2–5.9)
WBC # BLD AUTO: 5 K/UL (ref 4–11)

## 2025-04-17 PROCEDURE — 3078F DIAST BP <80 MM HG: CPT | Performed by: INTERNAL MEDICINE

## 2025-04-17 PROCEDURE — 3074F SYST BP LT 130 MM HG: CPT | Performed by: INTERNAL MEDICINE

## 2025-04-17 PROCEDURE — 99214 OFFICE O/P EST MOD 30 MIN: CPT | Performed by: INTERNAL MEDICINE

## 2025-04-17 ASSESSMENT — PATIENT HEALTH QUESTIONNAIRE - PHQ9
SUM OF ALL RESPONSES TO PHQ QUESTIONS 1-9: 0
SUM OF ALL RESPONSES TO PHQ QUESTIONS 1-9: 0
2. FEELING DOWN, DEPRESSED OR HOPELESS: NOT AT ALL
SUM OF ALL RESPONSES TO PHQ QUESTIONS 1-9: 0
SUM OF ALL RESPONSES TO PHQ QUESTIONS 1-9: 0
1. LITTLE INTEREST OR PLEASURE IN DOING THINGS: NOT AT ALL

## 2025-04-17 NOTE — PATIENT INSTRUCTIONS
Rectal bleeding:  Likely related to a rectal fissure or internal hemorrhoid.  Use Preparation H with witch hazel THREE times per day.  Also schedule a routine screening colonoscopy that is needed anyway for colon cancer screening  Anxious depression:  Continue taking sertraline (Zoloft) 200 mg (two 100 mg tablets) daily.    Social anxiety: Continue taking propranolol (Inderal) 20 mg before events.   Primary hypertension:  Blood pressure good today at 121/77, goal less than 130/80.  Continue taking lisinopril 10 mg every morning.       Follow-up as needed  LABS: CBC  Schedule colonoscopy.
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
## 2025-04-22 ENCOUNTER — TELEPHONE (OUTPATIENT)
Dept: INTERNAL MEDICINE CLINIC | Age: 60
End: 2025-04-22

## 2025-04-22 NOTE — TELEPHONE ENCOUNTER
Contains abnormal data CBC with Auto Differential  Order: [id number]   Status: Final result       Next appt: None       Dx: Rectal bleeding    Test Result Released: No    0 Result Notes          Component  Ref Range & Units (hover) 4/17/25 1100 8/19/21 0719 8/18/21 2330 8/15/21 0048 7/17/12 0720   WBC 5.0 5.1 6.6 8.6 6.6 R   RBC 4.57 4.22 4.41 4.41 5.26 R   Hemoglobin 16.3 14.9 15.6 15.8 17.9 High  R   Hematocrit 47.6 43.5 45.2 45.5 51.4   .3 High  103.2 High  102.5 High  103.2 High  97.6 R   MCH 35.7 High  35.4 High  35.4 High  35.8 High  33.9 R   MCHC 34.2 34.3 34.6 34.7 34.7 R   RDW 15.2 15.5 High  14.9 15.1 14.4 R   Platelets 232 249 278 296 236 R   MPV 8.3 7.5 7.2 7.4 8.1 R   Neutrophils % 39.5  47.3 37.0 57.9 R   Lymphocytes % 49.6  41.5 51.9 30.6 R   Monocytes % 5.3  6.3 6.3 7.0 R   Eosinophils % 4.3  3.9 3.7 3.8 R   Basophils % 1.3  1.0 1.1 0.7 R   Neutrophils Absolute 2.0  3.1 3.2    Lymphocytes Absolute 2.5  2.7 4.5 2.0 R   Monocytes Absolute 0.3  0.4 0.5 0.5 R   Eosinophils Absolute 0.2  0.3 0.3 0.2 R   Basophils Absolute 0.1  0.1 0.1 0.0 R   Granulocyte Absolute Count     3.8 R   Differential Type     Auto   Resulting Agency [facility]             Specimen Collected: 04/17/25 11:00 EDT Last Resulted: 04/17/25 18:20 EDT

## 2025-04-23 ENCOUNTER — CLINICAL DOCUMENTATION (OUTPATIENT)
Facility: HOSPITAL | Age: 60
End: 2025-04-23

## 2025-04-23 ENCOUNTER — TELEPHONE (OUTPATIENT)
Dept: INTERNAL MEDICINE CLINIC | Age: 60
End: 2025-04-23

## 2025-04-23 DIAGNOSIS — F40.10 SOCIAL PHOBIA: Primary | ICD-10-CM

## 2025-04-23 NOTE — TELEPHONE ENCOUNTER
Hemoglobin is stable at 16.3 gm/dL and actually up from 14.9 in August 2021.    Continue to use Preparation H with witch hazel THREE times per day as empiric therapy for possible hemorrhoid.  We will see if he can get in with Dr. Islas for GI evaluation and possible colonoscopy.

## 2025-04-23 NOTE — TELEPHONE ENCOUNTER
Patient called in asking about labs and states that he is still having rectal bleeding. Patient does not have insurance and states he can not afford a colonoscopy   I called the financial navigator to see if there is any outside help for him.

## 2025-04-23 NOTE — PROGRESS NOTES
Patient Assistance    Met with: Alejandro Ramos    Navigator Type: Oral  Documentation Type: New Patient  Contact Type: Telephone  Status of Patient Insurance Coverage: Patient does not have active coverage          Additional notes: Referred to the following Brokers: Dougie Landaverde / [employer] / cell: [phone] and Tony Calvo -  / [phone]    Per Mr. Ramos's request I spoke to his wife and provided the information.     Desiree Vyas   Financial Navigator

## 2025-05-13 RX ORDER — LISINOPRIL 10 MG/1
10 TABLET ORAL DAILY
Qty: 90 TABLET | Refills: 1 | Status: SHIPPED | OUTPATIENT
Start: 2025-05-13

## 2025-07-14 RX ORDER — SERTRALINE HYDROCHLORIDE 100 MG/1
TABLET, FILM COATED ORAL
Qty: 180 TABLET | Refills: 3 | Status: SHIPPED | OUTPATIENT
Start: 2025-07-14 | End: 2025-07-24

## 2025-07-14 NOTE — TELEPHONE ENCOUNTER
Requested Prescriptions     Pending Prescriptions Disp Refills    sertraline (ZOLOFT) 100 MG tablet 180 tablet 3     Si po qd      Also needs a parking placard.

## 2025-08-26 RX ORDER — TRAZODONE HYDROCHLORIDE 100 MG/1
100 TABLET ORAL NIGHTLY PRN
Qty: 90 TABLET | Refills: 1 | Status: SHIPPED | OUTPATIENT
Start: 2025-08-26